# Patient Record
Sex: MALE | Race: WHITE | NOT HISPANIC OR LATINO | Employment: OTHER | URBAN - METROPOLITAN AREA
[De-identification: names, ages, dates, MRNs, and addresses within clinical notes are randomized per-mention and may not be internally consistent; named-entity substitution may affect disease eponyms.]

---

## 2017-08-10 DIAGNOSIS — R06.02 SHORTNESS OF BREATH: ICD-10-CM

## 2017-08-10 DIAGNOSIS — R07.89 OTHER CHEST PAIN: ICD-10-CM

## 2017-08-10 DIAGNOSIS — I48.91 ATRIAL FIBRILLATION: Primary | ICD-10-CM

## 2017-08-10 DIAGNOSIS — R07.82 INTERCOSTAL PAIN: ICD-10-CM

## 2017-08-11 ENCOUNTER — HOSPITAL ENCOUNTER (OUTPATIENT)
Dept: RESPIRATORY THERAPY | Facility: HOSPITAL | Age: 62
Discharge: HOME | End: 2017-08-11
Attending: SPECIALIST
Payer: MEDICARE

## 2017-08-11 DIAGNOSIS — R07.89 OTHER CHEST PAIN: ICD-10-CM

## 2017-08-11 DIAGNOSIS — R07.82 INTERCOSTAL PAIN: ICD-10-CM

## 2017-08-11 DIAGNOSIS — I48.91 ATRIAL FIBRILLATION: ICD-10-CM

## 2017-08-11 DIAGNOSIS — R06.02 SHORTNESS OF BREATH: ICD-10-CM

## 2017-08-11 PROCEDURE — 94060 EVALUATION OF WHEEZING: CPT | Mod: 26,,, | Performed by: INTERNAL MEDICINE

## 2017-08-11 PROCEDURE — 94375 RESPIRATORY FLOW VOLUME LOOP: CPT

## 2017-08-11 PROCEDURE — 94727 GAS DIL/WSHOT DETER LNG VOL: CPT | Mod: 26,,, | Performed by: INTERNAL MEDICINE

## 2017-08-11 PROCEDURE — 94729 DIFFUSING CAPACITY: CPT | Mod: 26,,, | Performed by: INTERNAL MEDICINE

## 2017-08-11 PROCEDURE — 94729 DIFFUSING CAPACITY: CPT

## 2017-08-11 PROCEDURE — 94060 EVALUATION OF WHEEZING: CPT

## 2017-08-14 NOTE — PROCEDURES
Pulmonary Functions, including spirometry and bronchodilator response and lung volumes and diffusion, study was done aug 11, 2017.  Spirometry shows no obstruction and normal vital capacity and no bronchodilator response.   FEV1 is 86% or 3.41 liters.  Lung volumes show  normal TLC.  Diffusion shows reduced to 71% per va  uncorrected for anemia if present..    Pulmonary functions show low dlco and also low MVV, suspect sub optimal effort. Clinical correlation recommended.     Rui Tillman M.D.

## 2017-08-29 ENCOUNTER — HOSPITAL ENCOUNTER (OUTPATIENT)
Dept: RADIOLOGY | Facility: HOSPITAL | Age: 62
Discharge: HOME | End: 2017-08-29
Attending: SPECIALIST
Payer: MEDICARE

## 2017-08-29 ENCOUNTER — HOSPITAL ENCOUNTER (OUTPATIENT)
Dept: CARDIOLOGY | Facility: HOSPITAL | Age: 62
Discharge: HOME | End: 2017-08-29
Attending: SPECIALIST
Payer: MEDICARE

## 2017-08-29 DIAGNOSIS — I48.91 ATRIAL FIBRILLATION: ICD-10-CM

## 2017-08-29 DIAGNOSIS — R07.89 OTHER CHEST PAIN: ICD-10-CM

## 2017-08-29 DIAGNOSIS — R06.02 SHORTNESS OF BREATH: ICD-10-CM

## 2017-08-29 DIAGNOSIS — R07.82 INTERCOSTAL PAIN: ICD-10-CM

## 2017-08-29 DIAGNOSIS — R07.82 INTERCOSTAL PAIN: Primary | ICD-10-CM

## 2017-08-29 LAB — DIASTOLIC DYSFUNCTION: NO

## 2017-08-29 PROCEDURE — 71020 XR CHEST PA AND LATERAL: CPT | Mod: 26,,, | Performed by: RADIOLOGY

## 2017-08-29 PROCEDURE — 93017 CV STRESS TEST TRACING ONLY: CPT

## 2017-08-29 PROCEDURE — 71020 XR CHEST PA AND LATERAL: CPT | Mod: TC

## 2017-08-29 PROCEDURE — 78452 HT MUSCLE IMAGE SPECT MULT: CPT | Mod: TC

## 2017-08-29 PROCEDURE — 63600175 PHARM REV CODE 636 W HCPCS

## 2017-08-29 PROCEDURE — 78452 HT MUSCLE IMAGE SPECT MULT: CPT | Mod: 26,,, | Performed by: RADIOLOGY

## 2017-08-29 RX ORDER — REGADENOSON 0.08 MG/ML
INJECTION, SOLUTION INTRAVENOUS
Status: DISPENSED
Start: 2017-08-29 | End: 2017-08-29

## 2017-10-17 ENCOUNTER — OFFICE VISIT (OUTPATIENT)
Dept: PULMONOLOGY | Facility: CLINIC | Age: 62
End: 2017-10-17
Payer: MEDICARE

## 2017-10-17 VITALS
BODY MASS INDEX: 40.46 KG/M2 | HEART RATE: 61 BPM | WEIGHT: 305.31 LBS | SYSTOLIC BLOOD PRESSURE: 149 MMHG | DIASTOLIC BLOOD PRESSURE: 76 MMHG | OXYGEN SATURATION: 97 % | HEIGHT: 73 IN

## 2017-10-17 DIAGNOSIS — R94.2 ABNORMAL PULMONARY FUNCTION: Primary | ICD-10-CM

## 2017-10-17 DIAGNOSIS — E11.22 TYPE 2 DIABETES MELLITUS WITH CHRONIC KIDNEY DISEASE, WITHOUT LONG-TERM CURRENT USE OF INSULIN, UNSPECIFIED CKD STAGE: ICD-10-CM

## 2017-10-17 DIAGNOSIS — E66.01 MORBID OBESITY: ICD-10-CM

## 2017-10-17 PROCEDURE — 99999 PR PBB SHADOW E&M-EST. PATIENT-LVL IV: CPT | Mod: PBBFAC,,, | Performed by: INTERNAL MEDICINE

## 2017-10-17 PROCEDURE — 99213 OFFICE O/P EST LOW 20 MIN: CPT | Mod: S$GLB,,, | Performed by: INTERNAL MEDICINE

## 2017-10-17 NOTE — PROGRESS NOTES
"10/17/2017    Douglas Valle  New Patient Consult    Chief Complaint   Patient presents with    Abnormal PFT       HPI: smoked til 4/2000 when had cabg. 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  Pt has 40% ef per pt and got shocked 2 yrs ago.  Drinking may have triggered a fib.  Sob bending over , no lung symptoms.  Had sleep test past.        The chief compliant  problem is new to me",   PFSH:  Past Medical History:   Diagnosis Date    Anemia     Anticoagulant long-term use     Atrial fibrillation     CAD (coronary artery disease)     DM2 (diabetes mellitus, type 2)     Elevated homocysteine     Encounter for blood transfusion     Gout     HLD (hyperlipidemia)     HTN (hypertension)     Hypothyroid     Myocardial infarction     Wears glasses          Past Surgical History:   Procedure Laterality Date    CARDIAC SURGERY      CABG X 5  4-2000    CORONARY ARTERY BYPASS GRAFT      stent          TONSILLECTOMY       Social History   Substance Use Topics    Smoking status: Former Smoker     Packs/day: 3.00     Years: 30.00     Quit date: 4/8/2000    Smokeless tobacco: Never Used    Alcohol use Yes      Comment: OCC     Family History   Problem Relation Age of Onset    Diabetes Mother     Hypertension Mother     Heart disease Father      Review of patient's allergies indicates:  No Known Allergies    Performance Status:The patient's activity level is no limits with regular activity.      Review of Systems:  a review of eleven systems covering constitutional, Eye, HEENT, Psych, Respiratory, Cardiac, GI, , Musculoskeletal, Endocrine, Dermatologic was negative except for pertinent findings as listed ABOVE and below: all good.     Exam:Comprehensive exam done. BP (!) 149/76 (BP Location: Left arm)   Pulse 61   Ht 6' 1" (1.854 m)   Wt (!) 138.5 kg (305 lb 5.4 oz)   SpO2 97%   BMI 40.28 kg/m²   Exam included Vitals as listed, and patient's appearance and " affect and alertness and mood, oral exam for yeast and hygiene and pharynx lesions and Mallapatti (M) score, neck with inspection for jvd and masses and thyroid abnormalities and lymph nodes (supraclavicular and infraclavicular nodes and axillary also examined and noted if abn), chest exam included symmetry and effort and fremitus and percussion and auscultation, cardiac exam included rhythm and gallops and murmur and rubs and jvd and edema, abdominal exam for mass and hepatosplenomegaly and tenderness and hernias and bowel sounds, Musculoskeletal exam with muscle tone and posture and mobility/gait and  strength, and skin for rashes and cyanosis and pallor and turgor, extremity for clubbing.  Findings were normal except for pertinent findings listed below:   M4, no rales, rest good.    Radiographs (ct chest and cxr) reviewed: view by direct vision  Aug 29 cxr same as 2015    Labs reviewed creat 2.1 on metformin    PFT results reviewed   Pulmonary Functions, including spirometry and bronchodilator response and lung volumes and diffusion, study was done aug 11, 2017.  Spirometry shows no obstruction and normal vital capacity and no bronchodilator response.   FEV1 is 86% or 3.41 liters.  Lung volumes show  normal TLC.  Diffusion shows reduced to 71% per va  uncorrected for anemia if present..     Pulmonary functions show low dlco and also low MVV, suspect sub optimal effort. Clinical correlation recommended.     Rui Tillman M.D.      Plan:  Clinical impression is apparently straight forward and impression with management as below.    Douglas was seen today for abnormal pft.    Diagnoses and all orders for this visit:    Abnormal pulmonary function    Type 2 diabetes mellitus with chronic kidney disease, without long-term current use of insulin, unspecified CKD stage  -     Basic metabolic panel; Future  -     Basic metabolic panel    Morbid obesity        Return if symptoms worsen or fail to improve.    Discussed  with patient above for education the following:    Amiodarone may cause lung disease- dlco is sl low, better than expected after 3 ppd.     Chest xray ok and stable from 2015 to 2017 august.    What is planned duration of amiodarone?  May get acute lung toxicity or may develop slowly.  Short breath or cough would be problem.  Breathing test monitoring not as good as stopping amiodarone in future.    Kidney function is off-- creat 2.1, metformin may be a problem.  Suggest re check kidney function- may need to stop metformin.  Ordered via [facility]?    Bariatric surgery might be consideration for overall health?

## 2018-08-20 ENCOUNTER — OFFICE VISIT (OUTPATIENT)
Dept: PULMONOLOGY | Facility: CLINIC | Age: 63
End: 2018-08-20
Payer: MEDICARE

## 2018-08-20 VITALS
HEART RATE: 56 BPM | BODY MASS INDEX: 34.8 KG/M2 | SYSTOLIC BLOOD PRESSURE: 111 MMHG | DIASTOLIC BLOOD PRESSURE: 59 MMHG | OXYGEN SATURATION: 97 % | HEIGHT: 73 IN | WEIGHT: 262.56 LBS

## 2018-08-20 DIAGNOSIS — K21.9 GASTROESOPHAGEAL REFLUX DISEASE, ESOPHAGITIS PRESENCE NOT SPECIFIED: ICD-10-CM

## 2018-08-20 DIAGNOSIS — Z79.899 LONG TERM CURRENT USE OF AMIODARONE: ICD-10-CM

## 2018-08-20 DIAGNOSIS — R94.2 ABNORMAL PULMONARY FUNCTION: Primary | ICD-10-CM

## 2018-08-20 PROCEDURE — 99999 PR PBB SHADOW E&M-EST. PATIENT-LVL IV: CPT | Mod: PBBFAC,,, | Performed by: INTERNAL MEDICINE

## 2018-08-20 PROCEDURE — 3008F BODY MASS INDEX DOCD: CPT | Mod: CPTII,S$GLB,, | Performed by: INTERNAL MEDICINE

## 2018-08-20 PROCEDURE — 3078F DIAST BP <80 MM HG: CPT | Mod: CPTII,S$GLB,, | Performed by: INTERNAL MEDICINE

## 2018-08-20 PROCEDURE — 3074F SYST BP LT 130 MM HG: CPT | Mod: CPTII,S$GLB,, | Performed by: INTERNAL MEDICINE

## 2018-08-20 PROCEDURE — 99214 OFFICE O/P EST MOD 30 MIN: CPT | Mod: S$GLB,,, | Performed by: INTERNAL MEDICINE

## 2018-08-20 RX ORDER — GABAPENTIN 300 MG/1
CAPSULE ORAL
Refills: 5 | COMMUNITY
Start: 2018-05-19 | End: 2018-11-05

## 2018-08-20 RX ORDER — PANTOPRAZOLE SODIUM 40 MG/1
40 TABLET, DELAYED RELEASE ORAL DAILY
Refills: 4 | COMMUNITY
Start: 2018-07-26 | End: 2020-01-01

## 2018-08-20 RX ORDER — FENOFIBRATE 134 MG/1
134 CAPSULE ORAL
Refills: 5 | COMMUNITY
Start: 2018-06-25 | End: 2020-01-01

## 2018-08-20 RX ORDER — LEVOTHYROXINE SODIUM 100 UG/1
TABLET ORAL
Refills: 3 | COMMUNITY
Start: 2018-05-17 | End: 2018-11-05

## 2018-08-20 NOTE — PROGRESS NOTES
"2018    Douglas Valle       Chief Complaint   Patient presents with    Abnormal pulmonary functions       HPI:   Aug 20, 2018- last 2-3 wks having burning chest pain ppt going supine - eats 630 and bed 830,. Up 430.  rolaids helps, on protonix last 3 wks - no great help.  Was 305 April, loosing wt with dieting.  Still on amiodarone.  Elevate hob helps.        Oct 17, 2017smoked til 2000 when had cabg. 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  Pt has 40% ef per pt and got shocked 2 yrs ago.  Drinking may have triggered a fib.  Sob bending over , no lung symptoms.  Had sleep test past.        The chief compliant  problem is new to me",   PFSH:  Past Medical History:   Diagnosis Date    Anemia     Anticoagulant long-term use     Atrial fibrillation     CAD (coronary artery disease)     DM2 (diabetes mellitus, type 2)     Elevated homocysteine     Encounter for blood transfusion     Gout     HLD (hyperlipidemia)     HTN (hypertension)     Hypothyroid     Myocardial infarction     Wears glasses          Past Surgical History:   Procedure Laterality Date    CARDIAC SURGERY      CABG X 5  -    CORONARY ARTERY BYPASS GRAFT      stent          TONSILLECTOMY       Social History     Tobacco Use    Smoking status: Former Smoker     Packs/day: 3.00     Years: 30.00     Pack years: 90.00     Last attempt to quit: 2000     Years since quittin.3    Smokeless tobacco: Never Used   Substance Use Topics    Alcohol use: Yes     Comment: OCC    Drug use: No     Family History   Problem Relation Age of Onset    Diabetes Mother     Hypertension Mother     Heart disease Father      Review of patient's allergies indicates:  No Known Allergies    Performance Status:The patient's activity level is no limits with regular activity.      Review of Systems:  a review of eleven systems covering constitutional, Eye, HEENT, Psych, Respiratory, Cardiac, GI, " ", Musculoskeletal, Endocrine, Dermatologic was negative except for pertinent findings as listed ABOVE and below: all good.     Exam:Comprehensive exam done. BP (!) 111/59 (BP Location: Left arm, Patient Position: Sitting)   Pulse (!) 56   Ht 6' 1" (1.854 m)   Wt 119.1 kg (262 lb 9.1 oz)   SpO2 97% Comment: on room air  BMI 34.64 kg/m²   Exam included Vitals as listed, and patient's appearance and affect and alertness and mood, oral exam for yeast and hygiene and pharynx lesions and Mallapatti (M) score, neck with inspection for jvd and masses and thyroid abnormalities and lymph nodes (supraclavicular and infraclavicular nodes and axillary also examined and noted if abn), chest exam included symmetry and effort and fremitus and percussion and auscultation, cardiac exam included rhythm and gallops and murmur and rubs and jvd and edema, abdominal exam for mass and hepatosplenomegaly and tenderness and hernias and bowel sounds, Musculoskeletal exam with muscle tone and posture and mobility/gait and  strength, and skin for rashes and cyanosis and pallor and turgor, extremity for clubbing.  Findings were normal except for pertinent findings listed below:   M4, no rales, rest good.  chest is symmetric, no distress, normal percussion, normal fremitus and good normal breath sounds      Radiographs (ct chest and cxr) reviewed: view by direct vision  Aug 29 cxr same as 2015    Labs reviewed creat 2.1 on metformin    PFT results reviewed   Pulmonary Functions, including spirometry and bronchodilator response and lung volumes and diffusion, study was done aug 11, 2017.  Spirometry shows no obstruction and normal vital capacity and no bronchodilator response.   FEV1 is 86% or 3.41 liters.  Lung volumes show  normal TLC.  Diffusion shows reduced to 71% per va  uncorrected for anemia if present..     Pulmonary functions show low dlco and also low MVV, suspect sub optimal effort. Clinical correlation recommended.   "   Rui Tillman M.D.      Plan:  Clinical impression is apparently straight forward and impression with management as below.    There are no diagnoses linked to this encounter.    No Follow-up on file.    Discussed with patient above for education the following:    Amiodarone may cause lung disease- dlco is sl low, better than expected after 3 ppd.     Chest xray ok and stable from 2015 to 2017 august.    What is planned duration of amiodarone?  May get acute lung toxicity or may develop slowly.  Short breath or cough would be problem.  Breathing test monitoring not as good as stopping amiodarone in future.    Kidney function is off-- creat 2.1, metformin may be a problem.  Suggest re check kidney function- may need to stop metformin.  Ordered via [facility]?    Bariatric surgery might be consideration for overall health?

## 2018-08-20 NOTE — PATIENT INSTRUCTIONS
Check chest xray     Burning lower breast bone chest pain ppt by going supine likely from reflux- Protonix usually helps/controlls.  Would be wise to verify swallow tube good- GI Doc?  If not controlled and not gerd- may worsen causing stress/emergency presentation.  Elevate head of bed or sleep in recliner may help.              From up to date:Screening -- There are no adequate predictors of pulmonary toxicity due to amiodarone. Guidelines suggest obtaining a baseline and annual chest radiograph and baseline pulmonary function tests (including a DLCO) [42,43]. However, serial pulmonary function tests are not helpful because changes in DLCO are not specific for toxicity [44,45]. One study prospectively evaluated the usefulness of serial DLCO and spirometry measurements in 91 patients who were receiving amiodarone therapy for refractory arrhythmias [45]. Most of the asymptomatic patients whose DLCO decreased more than 20 percent did not develop pulmonary toxicity over the next year despite continued amiodarone therapy.

## 2018-08-21 ENCOUNTER — HOSPITAL ENCOUNTER (OUTPATIENT)
Dept: RADIOLOGY | Facility: HOSPITAL | Age: 63
Discharge: HOME | End: 2018-08-21
Attending: INTERNAL MEDICINE
Payer: MEDICARE

## 2018-08-21 DIAGNOSIS — R94.2 ABNORMAL PULMONARY FUNCTION: ICD-10-CM

## 2018-08-21 DIAGNOSIS — Z79.899 LONG TERM CURRENT USE OF AMIODARONE: ICD-10-CM

## 2018-08-21 PROCEDURE — 71046 X-RAY EXAM CHEST 2 VIEWS: CPT | Mod: TC,FY

## 2018-08-21 PROCEDURE — 71046 X-RAY EXAM CHEST 2 VIEWS: CPT | Mod: 26,,, | Performed by: RADIOLOGY

## 2018-08-29 ENCOUNTER — TELEPHONE (OUTPATIENT)
Dept: PULMONOLOGY | Facility: CLINIC | Age: 63
End: 2018-08-29

## 2018-08-29 NOTE — TELEPHONE ENCOUNTER
Attempted to contact pt in regards to results. No answer.   ----- Message from Rui Tillman MD sent at 8/21/2018  1:20 PM CDT -----  Notify cxr good

## 2018-08-29 NOTE — TELEPHONE ENCOUNTER
Contacted pt to notify him of test results. No further action needed.   ----- Message from Poly Gallagher sent at 8/29/2018  4:30 PM CDT -----  Type:  Test Results    Who Called:  pt  Name of Test (Lab/Mammo/Etc): lung   Xray Best Call Back Number:  [phone]

## 2018-09-20 ENCOUNTER — TELEPHONE (OUTPATIENT)
Dept: UROLOGY | Facility: CLINIC | Age: 63
End: 2018-09-20

## 2018-09-20 ENCOUNTER — HOSPITAL ENCOUNTER (EMERGENCY)
Facility: HOSPITAL | Age: 63
Discharge: HOME | End: 2018-09-20
Attending: EMERGENCY MEDICINE
Payer: MEDICARE

## 2018-09-20 VITALS
TEMPERATURE: 98 F | DIASTOLIC BLOOD PRESSURE: 72 MMHG | BODY MASS INDEX: 34.95 KG/M2 | SYSTOLIC BLOOD PRESSURE: 149 MMHG | WEIGHT: 258 LBS | HEIGHT: 72 IN | HEART RATE: 95 BPM | RESPIRATION RATE: 16 BRPM

## 2018-09-20 DIAGNOSIS — S37.30XA URETHRAL INJURY, INITIAL ENCOUNTER: Primary | ICD-10-CM

## 2018-09-20 LAB
BACTERIA #/AREA URNS HPF: ABNORMAL /HPF
BILIRUB UR QL STRIP: NEGATIVE
CLARITY UR: CLEAR
COLOR UR: YELLOW
GLUCOSE UR QL STRIP: NEGATIVE
HGB UR QL STRIP: ABNORMAL
KETONES UR QL STRIP: NEGATIVE
LEUKOCYTE ESTERASE UR QL STRIP: NEGATIVE
MICROSCOPIC COMMENT: ABNORMAL
NITRITE UR QL STRIP: NEGATIVE
PH UR STRIP: 6 [PH] (ref 5–8)
PROT UR QL STRIP: NEGATIVE
RBC #/AREA URNS HPF: 16 /HPF (ref 0–4)
SP GR UR STRIP: >=1.03 (ref 1–1.03)
SQUAMOUS #/AREA URNS HPF: 1 /HPF
URN SPEC COLLECT METH UR: ABNORMAL
UROBILINOGEN UR STRIP-ACNC: NEGATIVE EU/DL
WBC #/AREA URNS HPF: 0 /HPF (ref 0–5)

## 2018-09-20 PROCEDURE — 81000 URINALYSIS NONAUTO W/SCOPE: CPT

## 2018-09-20 PROCEDURE — 99283 EMERGENCY DEPT VISIT LOW MDM: CPT

## 2018-09-20 PROCEDURE — 87086 URINE CULTURE/COLONY COUNT: CPT

## 2018-09-20 RX ORDER — SILDENAFIL CITRATE 20 MG/1
20 TABLET ORAL 3 TIMES DAILY
COMMUNITY
End: 2018-11-05

## 2018-09-20 NOTE — DISCHARGE INSTRUCTIONS
Urethral injury - Return to ED for worsening bleeding or other new or worsening symptoms. Follow up with urology.    Consider stopping Xarelto until bleeding stops for good.  No sex or masturbation for 1 week to avoid urethral trauma and rebleeding.

## 2018-09-20 NOTE — ED PROVIDER NOTES
Encounter Date: 9/20/2018    SCRIBE #1 NOTE: Trinity PICKERING am scribing for, and in the presence of, Dr. Faith .       History     Chief Complaint   Patient presents with    Hematuria     pt reports took 3 sildenfil and then noticed bleeding with uriniation       Time seen by provider: 2:07 AM on 09/20/2018    Douglas Valle is a 63 y.o. male with CAD, NIDDM, pulmonary HTN and Atrial fibrillation on Xarelto and Prasugrel who presents to the ED with blood during urination onset 12 hours. Patient states that after he urinates he has blood that leaks specifically after he is finished urinating. Patient endorses having sex 12 hours ago and after sex the symptoms began. He states he is able to stop the bleeding occasionally by holding pressure. He denies any penile pain, scrotal pain, penile swelling, discharge, testicular pain, abdominal pain, painful urination, back pain, or fever.       The history is provided by the patient. No  was used.     Review of patient's allergies indicates:  No Known Allergies  Past Medical History:   Diagnosis Date    Anemia     Anticoagulant long-term use     Atrial fibrillation     CAD (coronary artery disease)     DM2 (diabetes mellitus, type 2)     Elevated homocysteine     Encounter for blood transfusion     Gout     HLD (hyperlipidemia)     HTN (hypertension)     Hypothyroid     Myocardial infarction     Wears glasses      Past Surgical History:   Procedure Laterality Date    CARDIAC SURGERY      CABG X 5  4-2000    CHOLECYSTECTOMY-LAPAROSCOPIC N/A 5/11/2015    [facility]    CORONARY ARTERY BYPASS GRAFT      ERCP N/A 5/15/2015    Performed by Prasanna Giraldo MD at [facility] ENDO    ERCP N/A 2/16/2015    [facility]O    stent          TONSILLECTOMY       Family History   Problem Relation Age of Onset    Diabetes Mother     Hypertension Mother     Heart disease  Father      Social History     Tobacco Use    Smoking status: Former Smoker     Packs/day: 3.00     Years: 30.00     Pack years: 90.00     Last attempt to quit: 2000     Years since quittin.4    Smokeless tobacco: Never Used   Substance Use Topics    Alcohol use: Yes     Comment: OCC    Drug use: No     Review of Systems   Constitutional: Negative for fever.   HENT: Negative for drooling and sore throat.    Eyes: Negative for photophobia and visual disturbance.   Respiratory: Negative for cough and shortness of breath.    Cardiovascular: Negative for chest pain.   Gastrointestinal: Negative for abdominal pain, diarrhea, nausea and vomiting.   Genitourinary: Positive for discharge (blood) and hematuria (end of void). Negative for difficulty urinating, dysuria, flank pain, genital sores, penile pain, penile swelling, scrotal swelling and testicular pain.   Musculoskeletal: Negative for back pain and neck pain.   Skin: Negative for rash.   Neurological: Negative for weakness and numbness.   Hematological: Does not bruise/bleed easily.   Psychiatric/Behavioral: Negative for confusion.       Physical Exam     Initial Vitals [18 0203]   BP Pulse Resp Temp SpO2   (!) 149/72 95 16 98 °F (36.7 °C) --      MAP       --         Physical Exam    Nursing note and vitals reviewed.  Constitutional: He appears well-developed and well-nourished. He is not diaphoretic. No distress.   HENT:   Head: Normocephalic and atraumatic.   Mouth/Throat: Oropharynx is clear and moist.   Eyes: Conjunctivae are normal.   Neck: Neck supple.   Cardiovascular: Normal rate, regular rhythm, normal heart sounds and intact distal pulses. Exam reveals no gallop and no friction rub.    No murmur heard.  Pulmonary/Chest: Breath sounds normal. He has no wheezes. He has no rhonchi. He has no rales.   Abdominal: Soft. He exhibits no distension. There is no tenderness.   Genitourinary: Testes normal. Right testis shows no swelling and no  tenderness. Left testis shows no swelling and no tenderness. Circumcised. No penile tenderness. Discharge (blood) found.   Genitourinary Comments: Small amount of dark blood noted at urethral meatus. No penile tenderness or ecchymosis. Bilateral descended testes without tenderness or swelling.    Musculoskeletal: Normal range of motion.   Neurological: He is alert and oriented to person, place, and time.   Skin: Skin is warm. Capillary refill takes less than 2 seconds. No rash noted. No erythema.         ED Course   Procedures  Labs Reviewed   URINALYSIS - Abnormal; Notable for the following components:       Result Value    Specific Gravity, UA >=1.030 (*)     Occult Blood UA 2+ (*)     All other components within normal limits   URINALYSIS MICROSCOPIC - Abnormal; Notable for the following components:    RBC, UA 16 (*)     All other components within normal limits   CULTURE, URINE          Imaging Results    None          Medical Decision Making:   History:   Old Medical Records: I decided to obtain old medical records.  Clinical Tests:   Lab Tests: Ordered and Reviewed            Scribe Attestation:   Scribe #1: I performed the above scribed service and the documentation accurately describes the services I performed. I attest to the accuracy of the note.    I, Dr. Ti Faith, personally performed the services described in this documentation. All medical record entries made by the scribe were at my direction and in my presence.  I have reviewed the chart and agree that the record reflects my personal performance and is accurate and complete. Ti Faith MD.  3:31 AM 09/20/2018    Douglas Valle is a 63 y.o. male presenting with minor urethral trauma likely sustained following sexual intercourse.  Patient has noted minor bleeding from the urethra apart from urination and at the end of voiding at times.  He is notably on rivaroxaban for AFib.  I doubt life-threatening bleeding.  UTI is very unlikely.   I suspect minor trauma to the urethra without complete disruption.  Rothman catheter for tamponade of bleeding discussed and declined.  I feel this is reasonable.  We did discuss temporary discontinuation of rivaroxaban with transient mild increased stroke risk with AFib versus better control of bleeding.  He elects to discontinue rivaroxaban until bleeding has stopped.  There is minimal bleeding evident the emergency department.  He is voiding without difficulty with no sign of obstruction.  I do not think emergent urologic intervention is indicated in this appropriate for outpatient follow-up.  Avoid any manipulation of the pedis including intercourse for 1 week after bleeding ceases.  Detailed return precautions reviewed.           Clinical Impression:   The encounter diagnosis was Urethral injury, initial encounter.      Disposition:   Disposition: Discharged  Condition: Stable                        Ti Faith MD  09/20/18 [id number]

## 2018-09-20 NOTE — TELEPHONE ENCOUNTER
Spoke with patient appointment scheduled for Tuesday 9/25 at 2:30pm with . Patient verbally voiced understanding.

## 2018-09-20 NOTE — ED NOTES
Patient identifiers for Douglas Valle checked and correct.  LOC:  Patient is awake, alert, and aware of environment with an appropriate affect. Patient is oriented x 3 and speaking appropriately.  APPEARANCE:  Patient resting comfortably and in no acute distress. Patient is clean and well groomed, patient's clothing is properly fastened.  SKIN:  The skin is warm and dry. Patient has normal skin turgor and moist mucus membranes. Skin is intact; no bruising or breakdown noted.  MUSCULOSKELETAL:  Patient is moving all extremities well, no obvious deformities noted. Pulses intact.   RESPIRATORY:  Airway is open and patent. Respirations are spontaneous and non-labored with normal effort and rate.  CARDIAC:  Patient has a normal rate and rhythm. No peripheral edema noted. Capillary refill < 3 seconds.  ABDOMEN:  No distention noted.  Soft and non-tender upon palpation.  NEUROLOGICAL:  PERRL. Facial expression is symmetrical. Hand grasps are equal bilaterally. Normal sensation in all extremities when touched with finger.  Allergies reported:  Review of patient's allergies indicates:  No Known Allergies  OTHER NOTES:  CO penile bleeding during and after UA.  States that he is on blood thinners.

## 2018-09-20 NOTE — TELEPHONE ENCOUNTER
----- Message from Charlene Lovett sent at 9/20/2018  8:16 AM CDT -----  Contact: Douglas  Type:  Sooner Apoointment Request    Caller is requesting a sooner appointment.  Caller declined first available appointment listed below.  Caller will not accept being placed on the waitlist and is requesting a message be sent to doctor.    Name of Caller:  patient  When is the first available appointment?  No schedule is pulling up  Symptoms:  [facility] 9/19/18; urethra injury, hematuria  Best Call Back Number:  [phone]  Additional Information:   States was instructed to follow up with Dr Arreaga. Thanks!

## 2018-09-21 LAB — BACTERIA UR CULT: NO GROWTH

## 2018-11-05 ENCOUNTER — HOSPITAL ENCOUNTER (EMERGENCY)
Facility: HOSPITAL | Age: 63
Discharge: HOME | End: 2018-11-05
Attending: EMERGENCY MEDICINE
Payer: MEDICARE

## 2018-11-05 ENCOUNTER — TELEPHONE (OUTPATIENT)
Dept: UROLOGY | Facility: CLINIC | Age: 63
End: 2018-11-05

## 2018-11-05 VITALS
OXYGEN SATURATION: 98 % | WEIGHT: 251.56 LBS | TEMPERATURE: 98 F | SYSTOLIC BLOOD PRESSURE: 130 MMHG | HEIGHT: 73 IN | RESPIRATION RATE: 16 BRPM | DIASTOLIC BLOOD PRESSURE: 80 MMHG | HEART RATE: 65 BPM | BODY MASS INDEX: 33.34 KG/M2

## 2018-11-05 VITALS
HEART RATE: 67 BPM | OXYGEN SATURATION: 99 % | SYSTOLIC BLOOD PRESSURE: 151 MMHG | TEMPERATURE: 99 F | HEIGHT: 72 IN | RESPIRATION RATE: 18 BRPM | DIASTOLIC BLOOD PRESSURE: 70 MMHG | WEIGHT: 255 LBS | BODY MASS INDEX: 34.54 KG/M2

## 2018-11-05 DIAGNOSIS — R31.0 GROSS HEMATURIA: Primary | ICD-10-CM

## 2018-11-05 LAB
ALBUMIN SERPL BCP-MCNC: 4.1 G/DL
ALP SERPL-CCNC: 41 U/L
ALT SERPL W/O P-5'-P-CCNC: 11 U/L
ANION GAP SERPL CALC-SCNC: 9 MMOL/L
AST SERPL-CCNC: 16 U/L
BACTERIA #/AREA URNS HPF: ABNORMAL /HPF
BASOPHILS # BLD AUTO: 0.1 K/UL
BASOPHILS NFR BLD: 1.7 %
BILIRUB SERPL-MCNC: 0.5 MG/DL
BILIRUB UR QL STRIP: NEGATIVE
BUN SERPL-MCNC: 34 MG/DL
CALCIUM SERPL-MCNC: 9.8 MG/DL
CHLORIDE SERPL-SCNC: 108 MMOL/L
CLARITY UR: ABNORMAL
CO2 SERPL-SCNC: 22 MMOL/L
COLOR UR: ABNORMAL
CREAT SERPL-MCNC: 2.2 MG/DL
DIFFERENTIAL METHOD: ABNORMAL
EOSINOPHIL # BLD AUTO: 0.2 K/UL
EOSINOPHIL NFR BLD: 3.1 %
ERYTHROCYTE [DISTWIDTH] IN BLOOD BY AUTOMATED COUNT: 14.9 %
EST. GFR  (AFRICAN AMERICAN): 35 ML/MIN/1.73 M^2
EST. GFR  (NON AFRICAN AMERICAN): 31 ML/MIN/1.73 M^2
GLUCOSE SERPL-MCNC: 104 MG/DL
GLUCOSE UR QL STRIP: NEGATIVE
HCT VFR BLD AUTO: 41.3 %
HGB BLD-MCNC: 14 G/DL
HGB UR QL STRIP: ABNORMAL
KETONES UR QL STRIP: NEGATIVE
LEUKOCYTE ESTERASE UR QL STRIP: NEGATIVE
LYMPHOCYTES # BLD AUTO: 1.1 K/UL
LYMPHOCYTES NFR BLD: 14.2 %
MCH RBC QN AUTO: 31 PG
MCHC RBC AUTO-ENTMCNC: 34 G/DL
MCV RBC AUTO: 91 FL
MICROSCOPIC COMMENT: ABNORMAL
MONOCYTES # BLD AUTO: 0.5 K/UL
MONOCYTES NFR BLD: 6.6 %
NEUTROPHILS # BLD AUTO: 5.9 K/UL
NEUTROPHILS NFR BLD: 74.4 %
NITRITE UR QL STRIP: NEGATIVE
PH UR STRIP: 6 [PH] (ref 5–8)
PLATELET # BLD AUTO: 183 K/UL
PMV BLD AUTO: 8.2 FL
POTASSIUM SERPL-SCNC: 4.2 MMOL/L
PROT SERPL-MCNC: 7 G/DL
PROT UR QL STRIP: ABNORMAL
RBC # BLD AUTO: 4.52 M/UL
RBC #/AREA URNS HPF: >100 /HPF (ref 0–4)
SODIUM SERPL-SCNC: 139 MMOL/L
SP GR UR STRIP: 1.02 (ref 1–1.03)
URN SPEC COLLECT METH UR: ABNORMAL
UROBILINOGEN UR STRIP-ACNC: NEGATIVE EU/DL
WBC # BLD AUTO: 7.9 K/UL
WBC #/AREA URNS HPF: 6 /HPF (ref 0–5)

## 2018-11-05 PROCEDURE — 88112 CYTOPATH CELL ENHANCE TECH: CPT | Mod: 26,,, | Performed by: PATHOLOGY

## 2018-11-05 PROCEDURE — 99283 EMERGENCY DEPT VISIT LOW MDM: CPT

## 2018-11-05 PROCEDURE — 85025 COMPLETE CBC W/AUTO DIFF WBC: CPT

## 2018-11-05 PROCEDURE — 99283 EMERGENCY DEPT VISIT LOW MDM: CPT | Mod: 27

## 2018-11-05 PROCEDURE — 88112 CYTOPATH CELL ENHANCE TECH: CPT | Performed by: PATHOLOGY

## 2018-11-05 PROCEDURE — 80053 COMPREHEN METABOLIC PANEL: CPT

## 2018-11-05 PROCEDURE — 81000 URINALYSIS NONAUTO W/SCOPE: CPT

## 2018-11-05 PROCEDURE — 36415 COLL VENOUS BLD VENIPUNCTURE: CPT

## 2018-11-05 NOTE — TELEPHONE ENCOUNTER
Returned call, patient went to [facility] for [facility]uria and told to follow up with Urology, soonest appt made to see the NP, patient verbally understood.

## 2018-11-05 NOTE — ED PROVIDER NOTES
Encounter Date: 11/5/2018    SCRIBE #1 NOTE: ITiffanie, am scribing for, and in the presence of, Ti Faith MD.       History     Chief Complaint   Patient presents with    Hematuria     started this morning       Time seen by provider: 9:23 AM on 11/05/2018    Douglas Valle is a 63 y.o. male with pmhx of HTN, DM2, CAD, Afib on Prasugrel and Rivaroxaban, and prior MI who presents to the ED for evaluation of hematuria. About 2 months ago (9/2018), the pt was evaluated for [facility] ED for evaluation of traumatic hematuria after intercourse. The pt noted hematuria towards the terminal portion of voiding. He was referred to Urology, but did not follow up, as his symptoms stopped the day after.  Today, the pt urinated once when he woke up and did not notice any blood. However, about 30 minutes pta, the pt urinated again and noticed blood. He reports that there is blood when he begins to urinate, but it clears up. He called Dr. Jackson, his PCP, who referred him to the ED for evaluation of possible kidney stones. He reports having some mild back pain last night, but it is currently relieved.   The patient denies fever, difficulty urinating, burning with urination, abdominal pain,vomiting, testicular pain, or any other symptoms at this time. SHx: CABG x5 (2000), Coronary Stent Placement (2014). Former Smoker (3 ppd, quit 2000). NKDA.       The history is provided by the patient.     Review of patient's allergies indicates:  No Known Allergies  Past Medical History:   Diagnosis Date    Anemia     Anticoagulant long-term use     Atrial fibrillation     CAD (coronary artery disease)     DM2 (diabetes mellitus, type 2)     Elevated homocysteine     Encounter for blood transfusion     Gout     HLD (hyperlipidemia)     HTN (hypertension)     Hypothyroid     Myocardial infarction     Wears glasses      Past Surgical History:   Procedure Laterality Date    CARDIAC SURGERY      CABG X 5       CHOLECYSTECTOMY-LAPAROSCOPIC N/A 2015    [facility]    CORONARY ARTERY BYPASS GRAFT      ERCP N/A 5/15/2015    [facility] ENDO    ERCP N/A 2015    [facility]O    stent          TONSILLECTOMY       Family History   Problem Relation Age of Onset    Diabetes Mother     Hypertension Mother     Heart disease Father      Social History     Tobacco Use    Smoking status: Former Smoker     Packs/day: 3.00     Years: 30.00     Pack years: 90.00     Last attempt to quit: 2000     Years since quittin.5    Smokeless tobacco: Never Used   Substance Use Topics    Alcohol use: Yes     Comment: OCC    Drug use: No     Review of Systems   Constitutional: Negative for fever.   HENT: Negative for sore throat.    Respiratory: Negative for shortness of breath.    Cardiovascular: Negative for chest pain.   Gastrointestinal: Negative for abdominal pain, nausea and vomiting.   Genitourinary: Positive for hematuria. Negative for difficulty urinating, dysuria and testicular pain.   Musculoskeletal: Positive for back pain (last night, now relieved).   Skin: Negative for rash.   Neurological: Negative for weakness.   Hematological: Does not bruise/bleed easily.       Physical Exam     Initial Vitals [18 0914]   BP Pulse Resp Temp SpO2   (!) 151/70 67 18 98.5 °F (36.9 °C) 99 %      MAP       --         Physical Exam    Nursing note and vitals reviewed.  Constitutional: He appears well-developed and well-nourished. He is not diaphoretic. No distress.   HENT:   Head: Normocephalic and atraumatic.   Mouth/Throat: Oropharynx is clear and moist.   Eyes: Conjunctivae are normal.   Neck: Neck supple.   Cardiovascular: Normal rate, regular rhythm, normal heart sounds and intact distal pulses. Exam reveals no gallop and no friction rub.    No murmur heard.  Pulmonary/Chest: Breath sounds normal. He has  no wheezes. He has no rhonchi. He has no rales.   Abdominal: Soft. He exhibits no distension. There is no tenderness. There is no CVA tenderness.   Musculoskeletal: Normal range of motion.   Neurological: He is alert and oriented to person, place, and time.   Skin: No rash noted. No erythema.         ED Course   Procedures  Labs Reviewed   URINALYSIS, REFLEX TO URINE CULTURE - Abnormal; Notable for the following components:       Result Value    Color, UA Red (*)     Appearance, UA Hazy (*)     Protein, UA Trace (*)     Occult Blood UA 3+ (*)     All other components within normal limits    Narrative:     Preferred Collection Type->Urine, Clean Catch   URINALYSIS MICROSCOPIC - Abnormal; Notable for the following components:    RBC, UA >100 (*)     WBC, UA 6 (*)     Bacteria, UA Few (*)     All other components within normal limits    Narrative:     Preferred Collection Type->Urine, Clean Catch          Imaging Results    None          Medical Decision Making:   History:   Old Medical Records: I decided to obtain old medical records.  Clinical Tests:   Lab Tests: Ordered and Reviewed            Scribe Attestation:   Scribe #1: I performed the above scribed service and the documentation accurately describes the services I performed. I attest to the accuracy of the note.    I, Dr. Ti Faith, personally performed the services described in this documentation. All medical record entries made by the scribe were at my direction and in my presence.  I have reviewed the chart and agree that the record reflects my personal performance and is accurate and complete. Ti Faith MD.  12:11 PM 11/05/2018    Douglas Valle is a 63 y.o. male presenting with gross hematuria without other associated symptoms.  He is voiding without difficulty and I doubt urinary retention.  He is notably anticoagulated.  I do not think other laboratories are indicated.  He appears euvolemic.  He did have similar transient episode  that was post coital in the past.  There is no postcoital or other traumatic history surrounding this episode.  I strongly urged him to follow up with Urology for further workup.  There is no indication of UTI on urinalysis.  I do not think Rothman catheter is indicated at this point.  Hematuria is clearing on repeat urination in the emergency department.  I doubt nephritis or acute kidney injury. Renal colic is unlikely lacking nausea or abdominal or flank pain. Return precautions reviewed.           Clinical Impression:     1. Gross hematuria                               Ti Faith MD  11/05/18 1214

## 2018-11-05 NOTE — TELEPHONE ENCOUNTER
----- Message from Theresakelly Marrero sent at 11/5/2018  1:00 PM CST -----  Contact: HANNAH JOHNS [[id number]]            Name of Who is Calling: HANNAH JOHNS [[id number]]    What is the request in detail: Patient called he states that he need an appointment as soon as possible, he have blood in his urine. Please call him to schedule an appointment this week.       Can the clinic reply by [facility]:no      What Number to Call Back if not in [facility]:

## 2018-11-06 ENCOUNTER — OFFICE VISIT (OUTPATIENT)
Dept: UROLOGY | Facility: CLINIC | Age: 63
End: 2018-11-06
Payer: MEDICARE

## 2018-11-06 ENCOUNTER — APPOINTMENT (OUTPATIENT)
Dept: LAB | Facility: HOSPITAL | Age: 63
End: 2018-11-06
Attending: NURSE PRACTITIONER
Payer: MEDICARE

## 2018-11-06 VITALS
DIASTOLIC BLOOD PRESSURE: 73 MMHG | TEMPERATURE: 99 F | RESPIRATION RATE: 18 BRPM | HEIGHT: 73 IN | BODY MASS INDEX: 33.04 KG/M2 | HEART RATE: 81 BPM | WEIGHT: 249.31 LBS | SYSTOLIC BLOOD PRESSURE: 150 MMHG

## 2018-11-06 DIAGNOSIS — R31.0 HEMATURIA, GROSS: Primary | ICD-10-CM

## 2018-11-06 DIAGNOSIS — R31.0 GROSS HEMATURIA: ICD-10-CM

## 2018-11-06 LAB
BILIRUB SERPL-MCNC: ABNORMAL MG/DL
BLOOD URINE, POC: 250
COLOR, POC UA: ABNORMAL
GLUCOSE UR QL STRIP: ABNORMAL
KETONES UR QL STRIP: ABNORMAL
LEUKOCYTE ESTERASE URINE, POC: ABNORMAL
NITRITE, POC UA: ABNORMAL
PH, POC UA: 5
PROTEIN, POC: 30
SPECIFIC GRAVITY, POC UA: 1.02
UROBILINOGEN, POC UA: ABNORMAL

## 2018-11-06 PROCEDURE — 3077F SYST BP >= 140 MM HG: CPT | Mod: CPTII,S$GLB,, | Performed by: NURSE PRACTITIONER

## 2018-11-06 PROCEDURE — 3008F BODY MASS INDEX DOCD: CPT | Mod: CPTII,S$GLB,, | Performed by: NURSE PRACTITIONER

## 2018-11-06 PROCEDURE — 99999 PR PBB SHADOW E&M-EST. PATIENT-LVL V: CPT | Mod: PBBFAC,,, | Performed by: NURSE PRACTITIONER

## 2018-11-06 PROCEDURE — 81002 URINALYSIS NONAUTO W/O SCOPE: CPT | Mod: S$GLB,,, | Performed by: NURSE PRACTITIONER

## 2018-11-06 PROCEDURE — 99204 OFFICE O/P NEW MOD 45 MIN: CPT | Mod: 25,S$GLB,, | Performed by: NURSE PRACTITIONER

## 2018-11-06 PROCEDURE — 88112 CYTOPATH CELL ENHANCE TECH: CPT | Mod: 26,,, | Performed by: PATHOLOGY

## 2018-11-06 PROCEDURE — 3078F DIAST BP <80 MM HG: CPT | Mod: CPTII,S$GLB,, | Performed by: NURSE PRACTITIONER

## 2018-11-06 PROCEDURE — 88112 CYTOPATH CELL ENHANCE TECH: CPT | Performed by: PATHOLOGY

## 2018-11-06 PROCEDURE — 87086 URINE CULTURE/COLONY COUNT: CPT

## 2018-11-06 RX ORDER — DOXYCYCLINE HYCLATE 100 MG
100 TABLET ORAL 2 TIMES DAILY
Qty: 28 TABLET | Refills: 0 | Status: SHIPPED | OUTPATIENT
Start: 2018-11-06 | End: 2018-11-20

## 2018-11-06 NOTE — ED PROVIDER NOTES
Encounter Date: 11/5/2018    SCRIBE #1 NOTE: I, Herbert Llanes, am scribing for, and in the presence of, Dr. Mae.       History     Chief Complaint   Patient presents with    Hematuria     Seen here this morning for same thing. Has not stopped and continues to bleed and not with just urination     11/05/2018 8:28 PM    The pt is a 63 y.o. male with a past medical history of anemia, atrial fibrilation, CAD, DM, HLD, HTN, hypothyroid, and MI presenting to the ED with a sudden onset of acute hematuria beginning 12 hrs ago. The pt was seen in the ED 12 hrs ago for similar symptoms. He reported he has been bleeding from his penis continuously. The pt stated symptoms are improved with applied pressure. He stated he takes xarelto, pletal, and advil. He has a history of cigarette smoking. The pt has a past surgical history of CABG and coronary stent placement.        The history is provided by the patient.     Review of patient's allergies indicates:  No Known Allergies  Past Medical History:   Diagnosis Date    Anemia     Anticoagulant long-term use     Atrial fibrillation     CAD (coronary artery disease)     DM2 (diabetes mellitus, type 2)     Elevated homocysteine     Encounter for blood transfusion     Gout     HLD (hyperlipidemia)     HTN (hypertension)     Hypothyroid     Myocardial infarction     Wears glasses      Past Surgical History:   Procedure Laterality Date    CARDIAC SURGERY      CABG X 5  4-2000    CHOLECYSTECTOMY-LAPAROSCOPIC N/A 5/11/2015    [facility]    CORONARY ARTERY BYPASS GRAFT      ERCP N/A 5/15/2015    [facility] ENDO    ERCP N/A 2/16/2015    [facility]    stent          TONSILLECTOMY       Family History   Problem Relation Age of Onset    Diabetes Mother     Hypertension Mother     Heart disease Father      Social History     Tobacco Use    Smoking status: Former  Smoker     Packs/day: 3.00     Years: 30.00     Pack years: 90.00     Last attempt to quit: 2000     Years since quittin.5    Smokeless tobacco: Never Used   Substance Use Topics    Alcohol use: Yes     Comment: OCC    Drug use: No     Review of Systems   Constitutional: Negative for fever.   HENT: Negative for congestion.    Eyes: Negative for visual disturbance.   Respiratory: Negative for wheezing.    Cardiovascular: Negative for chest pain.   Gastrointestinal: Negative for abdominal pain.   Genitourinary: Positive for hematuria. Negative for dysuria.   Musculoskeletal: Negative for joint swelling.   Skin: Negative for rash.   Neurological: Negative for syncope.   Hematological: Does not bruise/bleed easily.   Psychiatric/Behavioral: Negative for confusion.       Physical Exam     Initial Vitals [18]   BP Pulse Resp Temp SpO2   130/80 65 16 98.1 °F (36.7 °C) 98 %      MAP       --         Physical Exam    Nursing note and vitals reviewed.  Constitutional: He appears well-developed and well-nourished.  Non-toxic appearance. No distress.   HENT:   Head: Normocephalic and atraumatic.   Eyes: EOM are normal. Pupils are equal, round, and reactive to light.   Neck: Normal range of motion. Neck supple. No neck rigidity. No JVD present.   Cardiovascular: Normal rate, regular rhythm, normal heart sounds and intact distal pulses. Exam reveals no gallop and no friction rub.    No murmur heard.  Pulmonary/Chest: Breath sounds normal. He has no wheezes. He has no rhonchi. He has no rales.   Abdominal: Soft. Bowel sounds are normal. He exhibits no distension. There is no tenderness. There is no rigidity, no rebound and no guarding.   Genitourinary:   Genitourinary Comments: Gross hematuria with golf ball sized clots around penis   Musculoskeletal: Normal range of motion.   Neurological: He is alert and oriented to person, place, and time. He has normal strength and normal reflexes. No cranial nerve  deficit or sensory deficit. He exhibits normal muscle tone. Coordination normal. GCS eye subscore is 4. GCS verbal subscore is 5. GCS motor subscore is 6.   Skin: Skin is warm and dry.   Psychiatric: He has a normal mood and affect. His speech is normal and behavior is normal. He is not actively hallucinating.         ED Course   Procedures  Labs Reviewed   CBC W/ AUTO DIFFERENTIAL - Abnormal; Notable for the following components:       Result Value    RBC 4.52 (*)     RDW 14.9 (*)     MPV 8.2 (*)     Gran% 74.4 (*)     Lymph% 14.2 (*)     All other components within normal limits   COMPREHENSIVE METABOLIC PANEL - Abnormal; Notable for the following components:    CO2 22 (*)     BUN, Bld 34 (*)     Creatinine 2.2 (*)     Alkaline Phosphatase 41 (*)     eGFR if  35 (*)     eGFR if non  31 (*)     All other components within normal limits   CYTOLOGY SPECIMEN-URINE          Imaging Results    None          Medical Decision Making:   History:   Old Medical Records: I decided to obtain old medical records.  Old Records Summarized: records from previous admission(s).       <> Summary of Records: Patient evaluated emergency department earlier today for the same complaint.  Urinalysis did not show evidence of infection.  Initial Assessment:   63-year-old man presents emergency department for the 2nd time today complaining of gross hematuria.  He has follow-up scheduled with Urology at 1:00 p.m. tomorrow.  Patient continued to have episodes of gross hematuria throughout the day.  He states that he is having blood dripping from his urethra even without needing to urinate and having to wear pads in his underwear.  He has no signs of symptomatic anemia.  No syncope.  Laboratory evaluation was performed and patient is not anemic.  He has no tachycardia.  I do not believe he is having a significant amount of hemorrhage that would necessitate inpatient hospitalization or blood transfusion.  His  renal function is decreased but is consistent with previous labs.  Discussed with Dr. jackson who will see the patient in clinic tomorrow.  Offered admission to the patient but they would prefer to follow up on outpatient basis with the urologist.  Urine cytology has been sent.  I believe patient is in stable condition for outpatient follow-up.  Return precautions discussed for worsening symptoms.  Clinical Tests:   Lab Tests: Ordered and Reviewed            Scribe Attestation:   Scribe #1: I performed the above scribed service and the documentation accurately describes the services I performed. I attest to the accuracy of the note.    I, Tu Garcia, personally performed the services described in this documentation. All medical record entries made by the scribe were at my direction and in my presence.  I have reviewed the chart and agree that the record reflects my personal performance and is accurate and complete. Ivan Mae MD.  11:40 PM 11/05/2018             Clinical Impression:   The encounter diagnosis was Gross hematuria.      Disposition:   Disposition: Discharged  Condition: Stable                        Ivan Mae MD  11/05/18 3857

## 2018-11-06 NOTE — PATIENT INSTRUCTIONS
Blood in the Urine    Blood in the urine (hematuria) has many possible causes. If it occurs after an injury (such as a car accident or fall), it is most often a sign of bruising to the kidney or bladder. Common causes of blood in the urine include urinary tract infections, kidney stones, inflammation, tumors, or certain other diseases of the kidney or bladder. Menstruation can cause blood to appear in the urine sample, although it is not coming from the urinary tract.  If only a trace amount of blood is present, it will show up on the urine test, even though the urine may be yellow and not pink or red. This may occur with any of the above conditions, as well as heavy exercise or high fever. In this case, your doctor may want to repeat the urine test on another day. This will show if the blood is still present. If it is, then other tests can be done to find out the cause.  Home care  Follow these home care guidelines:  · If your urine does not appear bloody (pink, brown or red) then you do not need to restrict your activity in any way.  · If you can see blood in your urine, rest and avoid heavy exertion until your next exam. Do not use aspirin, blood thinners, or anti-platelet or anti-inflammatory medicines. These include ibuprofen and naproxen. These thin the blood and may increase bleeding.  Follow-up care  Follow up with your healthcare provider, or as advised. If you were injured and had blood in your urine, you should have a repeat urine test in 1 to 2 days. Contact your doctor for this test.  A radiologist will review any X-rays that were taken. You will be told of any new findings that may affect your care.  When to seek medical advice  Call your healthcare provider right away if any of these occur:  · Bright red blood or blood clots in the urine (if you did not have this before)  · Weakness, dizziness or fainting  · New groin, abdominal, or back pain  · Fever of 100.4ºF (38ºC) or higher, or as directed by  your healthcare provider  · Repeated vomiting  · Bleeding from the nose or gums or easy bruising  Date Last Reviewed: 9/1/2016 © 2000-2017 [employer]. [address] [place] [address], [place] [address]. All rights reserved. This information is not intended as a substitute for professional medical care. Always follow your healthcare professional's instructions.        What is Hematuria?  Blood in your urine is a condition known as hematuria. Most of the time, the cause of hematuria is not serious. But, never ignore blood in the urine. Your doctor can evaluate you to find the cause of the bleeding and treat it, if needed.  Types of hematuria  · Gross hematuria means that the blood can be seen by the naked eye. The urine may look pinkish, brownish, or bright red.  · Microscopic hematuria means that the urine is clear, but blood cells can be seen when urine is looked at under a microscope or tested in a lab.  Both types of hematuria can have the same causes. Neither one is necessarily more serious than the other. With either type, you may have other symptoms, such as pain, pressure, or burning when you urinate, abdominal pain, or back pain. Or, you may not have any other symptoms. No matter how much blood is found, the cause of the bleeding needs to be identified.  Finding the cause of hematuria  To evaluate your condition, your doctor will first confirm that blood is indeed present. Then other tests are done to pinpoint where the blood is coming from and why. Your doctor will decide which tests will best determine the cause of your hematuria. Some common tests are listed below.  · History and physical exam  · Lab tests may include urinalysis, a urine culture, a urine cytology, and various blood tests  · Cystoscopy  · Computed tomography (CT) or CT urography  · Magnetic resonance imaging (MRI) or MR urography  · Ultrasound of the kidney  · Kidney biopsy  Causes of hematuria include the very benign (exercised  induced hematuria) to the very severe (cancer of the urinary system). A variety of treatments are available depending on the cause.  Date Last Reviewed: 12/2/2016 © 2000-2017 [employer]. [address] [place] [address], [place] [address]. All rights reserved. This information is not intended as a substitute for professional medical care. Always follow your healthcare professional's instructions.        Hematuria: Possible Causes     Many things can lead to blood in the urine (hematuria). The blood may be seen with the eye (macroscopic or gross hematuria). Or it may only be seen when the urine is looked at under a microscope (microscopic hematuria). Some of the most common causes of blood in the urine are listed below. Often, no cause for the blood can be found. This is called idiopathic hematuria.  · Kidney or bladder stones are collections of crystals. They form in the urine. Stones may be found anywhere in the urinary tract. But they form most often in the kidneys or bladder. In addition to blood in the urine, they can cause severe pain.  · BPH stands for benign prostatic hyperplasia. It is enlargement of the prostate gland. It happens as men age. BPH often causes problems with urination. It sometimes causes blood in the urine.  · A urinary tract infection (UTI) is due to bacteria growing in the urinary tract. It can cause blood in the urine. Other symptoms include burning or pain with urination. You may need to urinate often or urgently. You may also have a fever.  · Damage to the urinary tract may cause blood in the urine. This damage may be due to a blow or accident. It may also result from the use of a urinary catheter. Very hard exercise may sometimes irritate the urinary tract and cause bleeding.  · Cancer may occur anywhere in the urinary tract. A tumor may sometimes cause no symptoms other than bleeding.     Other possible causes of bleeding include:  · Prostatitis (infection of the prostate  gland)  · Taking anticoagulants  · Blockage in the urinary tract  · Disease or inflammation of the kidney  · Cystic diseases of the kidneys  · Sickle cell anemia  · Vigorous exercise  · Endometriosis  Date Last Reviewed: 12/1/2016  © 8113-8442 [employer]. [address] [place] [address], [place] [address]. All rights reserved. This information is not intended as a substitute for professional medical care. Always follow your healthcare professional's instructions.

## 2018-11-06 NOTE — PROGRESS NOTES
[facility] Note  Staff: KADIE Yang    PCP: Kamilah Jackson  Cardiologist:  Dr. Cobb ([address]    Chief Complaint: [facility] ED F/UP:  Gross hematuria    Subjective:        HPI: Douglas Valle is a 63 y.o. male NEW PATIENT presents with GROSS HEMATURIA which began yesterday with no improvement in symptoms.  He currently denies dysuria or problems with urination.  He has been to ED at [facility] twice.  No urine cultures, no cytology and no imaging was performed at each visit.    Dr. Aguilar was called on pt last night while he was on call for consult advice via telephone, which ED sent pt home again knowing he already had an appt with our office today.    REVIEW OF SYSTEMS:  A comprehensive 10 system review was performed and is negative except as noted above in HPI    PMHx:  Past Medical History:   Diagnosis Date    Anemia     Anticoagulant long-term use     Atrial fibrillation     CAD (coronary artery disease)     DM2 (diabetes mellitus, type 2)     Elevated homocysteine     Encounter for blood transfusion     Gout     HLD (hyperlipidemia)     HTN (hypertension)     Hypothyroid     Myocardial infarction     Wears glasses      Kidney stones: No  Cataracts? None    PSHx:  Past Surgical History:   Procedure Laterality Date    CARDIAC SURGERY      CABG X 5  4-2000    CHOLECYSTECTOMY-LAPAROSCOPIC N/A 5/11/2015    [facility]    CORONARY ARTERY BYPASS GRAFT      ERCP N/A 5/15/2015    [facility] ENDO    ERCP N/A 2/16/2015    [facility]    stent          TONSILLECTOMY       Fam Hx:   malignancies: No   kidney stones: No     Soc Hx:  No tobacco user    Allergies:  Patient has no known allergies.    Medications: reviewed   **Anticoagulation: Yes - Pletal 100 mg one tablet daily  Xarelto 20 mg one tablet daily    Objective:     Vitals:    11/06/18 1326   BP: (!)  150/73   Pulse: 81   Resp: 18   Temp: 98.5 °F (36.9 °C)     General:WDWN in NAD  Eyes: PERRLA, normal conjunctiva  Respiratory: no increased work on breathing, clear to auscultation  Cardiovascular: regular rate and rhythm. No obvious extremity edema.  GI: palpation of masses. No tenderness. No hepatosplenomegaly to palpation.  Musculoskeletal: normal range of motion of bilateral upper extremities. Normal muscle strength and tone.  Skin: no obvious rashes or lesions. No tightening of skin noted.  Neurologic: CN grossly normal. Normal sensation.   Psychiatric: awake, alert and oriented x 3. Mood and affect normal. Cooperative.    LABS REVIEW:  UA today:  ABNORMAL    UCx:   Results for orders placed or performed during the hospital encounter of 09/20/18   Urine culture   Result Value Ref Range    Urine Culture, Routine No growth    Results for orders placed or performed during the hospital encounter of 05/14/15   Urine culture   Result Value Ref Range    Urine Culture, Routine No growth      Cr:   Lab Results   Component Value Date    CREATININE 2.2 (H) 11/05/2018     Assessment:       1. Hematuria, gross    2. Gross hematuria          Plan:   Gross hematuria:    1.  Urine culture, urine cytology to be performed.  2.  CT Urogram with and without contrast to be done.  3.  Vibra-tabs 100 mg 1 po BID x 14 days prescribed to pt during ov today.  4.  Pt and wife are going today to Dr. Cobb's office (cardiologist) to inform them of current gross hematuria situation and start process for cardiac clearance with blood thinners in case we plan for surgery at later date.    F/u with Urologist within the week for possible further treatment with Cystoscopy procedure.    [facility]: Pending    Valerie Patiño, ASHLIE-C

## 2018-11-07 ENCOUNTER — HOSPITAL ENCOUNTER (OUTPATIENT)
Dept: RADIOLOGY | Facility: HOSPITAL | Age: 63
Discharge: HOME | End: 2018-11-07
Attending: NURSE PRACTITIONER
Payer: MEDICARE

## 2018-11-07 ENCOUNTER — TELEPHONE (OUTPATIENT)
Dept: UROLOGY | Facility: CLINIC | Age: 63
End: 2018-11-07

## 2018-11-07 DIAGNOSIS — R31.0 GROSS HEMATURIA: ICD-10-CM

## 2018-11-07 PROCEDURE — 25500020 PHARM REV CODE 255

## 2018-11-07 PROCEDURE — 74178 CT ABD&PLV WO CNTR FLWD CNTR: CPT | Mod: TC

## 2018-11-07 PROCEDURE — 74178 CT ABD&PLV WO CNTR FLWD CNTR: CPT | Mod: 26,,, | Performed by: RADIOLOGY

## 2018-11-07 RX ORDER — SODIUM CHLORIDE 9 MG/ML
INJECTION, SOLUTION INTRAVENOUS
Status: DISPENSED
Start: 2018-11-07 | End: 2018-11-07

## 2018-11-07 RX ADMIN — IOHEXOL 75 ML: 350 INJECTION, SOLUTION INTRAVENOUS at 11:11

## 2018-11-07 NOTE — TELEPHONE ENCOUNTER
"Called patient to offer appointment with Dr. Looney 11/16. He says he cannot wait that long.  He is bleeding and having pain.  Advised that still waiting for CT and ucx.   He says he will "bleed out" by then.    Please advise if sooner appt available.   "

## 2018-11-07 NOTE — TELEPHONE ENCOUNTER
Called patient and gave ct results; recommended that he see Urologist and he didn't agree to the soonest appt of 11/16. Says he needed to be seen sooner, he was given the number to [facility] to see if he could be seen sooner there.

## 2018-11-07 NOTE — TELEPHONE ENCOUNTER
----- Message from Prasanna Looney MD sent at 11/7/2018 12:26 PM CST -----  Regarding: RE: Gross hematuria pt  Can see him at 2 next Friday 16th  ----- Message -----  From: ASHLIE Choi  Sent: 11/7/2018  10:10 AM  To: Yris Arreaga MD, #  Subject: Gross hematuria pt                               Pt was seen by me yesterday for gross hematuria since MOnday.  [facility] saw him twice with no workup, no even culture or imaging.  I started the workup yesterday and he needs to be seen.  Thanks.

## 2018-11-08 ENCOUNTER — OFFICE VISIT (OUTPATIENT)
Dept: UROLOGY | Facility: CLINIC | Age: 63
End: 2018-11-08
Payer: MEDICARE

## 2018-11-08 ENCOUNTER — HOSPITAL ENCOUNTER (OUTPATIENT)
Dept: RADIOLOGY | Facility: HOSPITAL | Age: 63
Discharge: HOME | End: 2018-11-08
Attending: UROLOGY
Payer: MEDICARE

## 2018-11-08 VITALS
HEIGHT: 73 IN | HEART RATE: 82 BPM | DIASTOLIC BLOOD PRESSURE: 73 MMHG | WEIGHT: 242.81 LBS | BODY MASS INDEX: 32.18 KG/M2 | SYSTOLIC BLOOD PRESSURE: 133 MMHG

## 2018-11-08 DIAGNOSIS — R31.0 GROSS HEMATURIA: ICD-10-CM

## 2018-11-08 DIAGNOSIS — R31.0 GROSS HEMATURIA: Primary | ICD-10-CM

## 2018-11-08 DIAGNOSIS — N48.89 PENILE BLEEDING: ICD-10-CM

## 2018-11-08 DIAGNOSIS — R82.90 ABNORMAL FINDING IN URINE: ICD-10-CM

## 2018-11-08 DIAGNOSIS — S39.94XA PENILE TRAUMA, INITIAL ENCOUNTER: ICD-10-CM

## 2018-11-08 LAB
BACTERIA UR CULT: NORMAL
BACTERIA UR CULT: NORMAL
BILIRUB SERPL-MCNC: ABNORMAL MG/DL
BLOOD URINE, POC: 250
COLOR, POC UA: YELLOW
GLUCOSE UR QL STRIP: ABNORMAL
KETONES UR QL STRIP: ABNORMAL
LEUKOCYTE ESTERASE URINE, POC: ABNORMAL
NITRITE, POC UA: ABNORMAL
PH, POC UA: 5
PROTEIN, POC: ABNORMAL
SPECIFIC GRAVITY, POC UA: 1.02
UROBILINOGEN, POC UA: ABNORMAL

## 2018-11-08 PROCEDURE — 99358 PROLONG SERVICE W/O CONTACT: CPT | Mod: S$GLB,,, | Performed by: UROLOGY

## 2018-11-08 PROCEDURE — 3075F SYST BP GE 130 - 139MM HG: CPT | Mod: CPTII,S$GLB,, | Performed by: UROLOGY

## 2018-11-08 PROCEDURE — 74450 X-RAY URETHRA/BLADDER: CPT | Mod: 26,,, | Performed by: RADIOLOGY

## 2018-11-08 PROCEDURE — 52000 CYSTOURETHROSCOPY: CPT | Mod: S$GLB,,, | Performed by: UROLOGY

## 2018-11-08 PROCEDURE — 87086 URINE CULTURE/COLONY COUNT: CPT

## 2018-11-08 PROCEDURE — 74450 X-RAY URETHRA/BLADDER: CPT | Mod: TC

## 2018-11-08 PROCEDURE — 51610 INJECTION FOR BLADDER X-RAY: CPT | Mod: ,,, | Performed by: RADIOLOGY

## 2018-11-08 PROCEDURE — 25500020 PHARM REV CODE 255

## 2018-11-08 PROCEDURE — 3008F BODY MASS INDEX DOCD: CPT | Mod: CPTII,S$GLB,, | Performed by: UROLOGY

## 2018-11-08 PROCEDURE — 99215 OFFICE O/P EST HI 40 MIN: CPT | Mod: 25,S$GLB,, | Performed by: UROLOGY

## 2018-11-08 PROCEDURE — 3078F DIAST BP <80 MM HG: CPT | Mod: CPTII,S$GLB,, | Performed by: UROLOGY

## 2018-11-08 PROCEDURE — 99999 PR PBB SHADOW E&M-EST. PATIENT-LVL V: CPT | Mod: PBBFAC,,, | Performed by: UROLOGY

## 2018-11-08 PROCEDURE — 81002 URINALYSIS NONAUTO W/O SCOPE: CPT | Mod: S$GLB,,, | Performed by: UROLOGY

## 2018-11-08 RX ADMIN — IOTHALAMATE MEGLUMINE 50 ML: 172 INJECTION URETERAL at 02:11

## 2018-11-08 NOTE — PROGRESS NOTES
[facility] Note  Staff: Yris Arreaga MD  PCP: Kamilah Jackson   Cardiologist:  Dr. Cobb ([facility]    [facility]  Gross hematuria    Subjective:        HPI: Douglas Valle is a 63 y.o. male     Hematuria twice, lasted one day about 2 months ago then again on 11/5/18. He was discharged from ER with plan for outpt f/u. Saw stanislaw on 11/6/18 who ordered a ctu and referred him to me. He is on xarelto and pletal. ctu done which showed no obvious causes for blood in urine.  Cytology pending, 45 pack year h/o smoking. On further investigation he states he has had mostly Penile bleeding not associated with hematuria.  No uti sx.  No burning. No h/o std.     On exam today I found copious amounts of old blood in his depends. Exam of his penis showed blood dripping from his penis. He had no penile edema. No scrotal edema.  I then placed a 20fr coude which went easily and urine was essentially clear. On further questioning he remembers trauma/ something hard hit his penis/groin the day prior to going to ER when all this started. I removed the catheter and then performed a cytoscopy to rule out any obvious trauma although I supected it was most likely bruising and bleeding without a urethral tear as extravasation of urine would result in edema. On cystoscopy today I could see that he had some bleeding in the pendulous urethra, distal to the bulb. On exam he stated that this is where he had trauma earlier. His bladder neck had some minor bleeding and his bladder had one small erythemaous spot on his left bladder wall that was either swanson trauma or cystitis or tumor, this will eventually need further eval.    I then sent him STAT to radiology after speaking to radiology and had them perform a RUG to confirm no extravasation as that would be an emergent procedure. The RUG was negative. Both  talked and reviewed his imaging. I then had him return to clinic  with a 22fr coude from the OR and I placed this easily.     Ua void: tr leuk/tr prot/250 blood- send for culture, no sx.   Urine history:  18 Multiple organisms, void: red/250 blood, cytology: pending  18 No cx, void: tr prot/3+blood, cytology: pending  18 Ng, void: 2+blood  5/16/15 ng      REVIEW OF SYSTEMS:  A comprehensive 10 system review was performed and is negative except as noted above in HPI    PMHx:  Past Medical History:   Diagnosis Date    Anemia     Anticoagulant long-term use     Atrial fibrillation     CAD (coronary artery disease)     DM2 (diabetes mellitus, type 2)     Elevated homocysteine     Encounter for blood transfusion     Gout     HLD (hyperlipidemia)     HTN (hypertension)     Hypothyroid     Myocardial infarction     Wears glasses      Kidney stones: No  Cataracts? None    PSHx:  Past Surgical History:   Procedure Laterality Date    CARDIAC SURGERY      CABG X 5  -    CHOLECYSTECTOMY-LAPAROSCOPIC N/A 2015    [facility]    CORONARY ARTERY BYPASS GRAFT      ERCP N/A 5/15/2015    [facility] ENDO    ERCP N/A 2015    [facility]    stent          TONSILLECTOMY       Cardiology: stents x 1, last one placed in , cabg in . afib on xarelto. PVD and on pletal.  Cardiologist:Dr.George Cobb - sees every 3 months  Nephrologist: karla    Colonoscopy: FOBT negative last year, never had a colonscopy-mother had colon cancer    Fam Hx:   malignancies: No. Gyn cancer: mother with ovarian cancer diagnosed at end of life. Mother with colon cancer.  Mother  a 80. Father  a 84  kidney stones: No     Soc Hx:  Quit smoking 2000. 45 pack year hx of smoking  No alcohol  Lives in [place]  , here with wife Poly  2 children  Owns a box company    Allergies:  Patient has no known allergies.    Medications: reviewed    Anticoagulation: Yes - Pletal 100 mg one tablet daily, Xarelto 20 mg one tablet daily. afib    Objective:     Vitals:    11/08/18 0906   BP: 133/73   Pulse: 82     General:WDWN in NAD  Eyes: PERRLA, normal conjunctiva  Respiratory: no increased work on breathing, clear to auscultation  Cardiovascular: regular rate and rhythm. No obvious extremity edema.  GI: palpation of masses. No tenderness. No hepatosplenomegaly to palpation.  Musculoskeletal: normal range of motion of bilateral upper extremities. Normal muscle strength and tone.  Skin: no obvious rashes or lesions. No tightening of skin noted.  Neurologic: CN grossly normal. Normal sensation.   Psychiatric: awake, alert and oriented x 3. Mood and affect normal. Cooperative.     exam:   Inspection of anus normal  No scrotal rashes, cysts or lesions  Epididymis normal in size, no tenderness  Testes normal and size, equal size bilaterally, no masses  Urethral meatus normal without discharge  Penis is circumcised . Large clots   ANSELMO: 45g gland without masses, tenderness. SV not palpable. Normal sphincter tone. No hemhorroids.  No bilateral inguinal hernias noted           LABS REVIEW:  BMP  Lab Results   Component Value Date     11/05/2018    K 4.2 11/05/2018     11/05/2018    CO2 22 (L) 11/05/2018    BUN 34 (H) 11/05/2018    CREATININE 2.2 (H) 11/05/2018    CALCIUM 9.8 11/05/2018    ANIONGAP 9 11/05/2018    ESTGFRAFRICA 35 (A) 11/05/2018    EGFRNONAA 31 (A) 11/05/2018     Lab Results   Component Value Date    WBC 7.90 11/05/2018    HGB 14.0 11/05/2018    HCT 41.3 11/05/2018    MCV 91 11/05/2018     11/05/2018       Pathology:  Cytology 11/6/18 and 11/5/18 pending    Radiology  Rug 11/8/18  No extravasation of urine    ctu 11/7/18 images reviewed  1. No obstructive uropathy, stones or other acute  abnormality.  2. Left renal simple cyst.  3. Prostatomegaly.  Correlate with PSA.  4. Coronary artery disease.    Assessment:       1. Gross  hematuria    2. Abnormal finding in urine     3. Penile bleeding    4. Penile trauma, initial encounter          Plan:     Gross hematuria secondary to his penile trauma   I have confirmed he has no extravasation and needs no immediate surgical treatment. I have placed a 22fr coude swanson and would like this to remain until next Tuesday to tamponade his urethral bleeding. I would like him to continue to hold his xarelto until the catheter has been out for at least 2 to 3 days as he could develop recurrent bleeding when catheter removed or he voids. He will be seeing his cardiologist Monday to confirm this is ok and obtain clearance for possible cystoscopy under anesthesia in the future.    Depending on cytology will need outpt flexible cystosocpy in 4-8 weeks after swanson removed to ensure no false lesions as a result of trauma from the catheter.     Still needs psa    Counseled when to return to ER, significant hematuria resulting in catheter not drainage. Expect angel-catheter bleeding but should improve off blood thinners.   Continue abx for 3 more days.    I spent 60 minutes with the patient of which more than half was spent in direct consultation with the patient in regards to our treatment and plan.  An additional 60 minutes was spent in treatment of this pt, 30 minutes direct face to face and 30 minutes indirect with consultation of other physicians and departments.     See separate cysto note.     .    [facility]: Pending    KADIE Rudolph

## 2018-11-09 ENCOUNTER — TELEPHONE (OUTPATIENT)
Dept: UROLOGY | Facility: CLINIC | Age: 63
End: 2018-11-09

## 2018-11-09 ENCOUNTER — HOSPITAL ENCOUNTER (EMERGENCY)
Facility: HOSPITAL | Age: 63
Discharge: HOME | End: 2018-11-09
Attending: EMERGENCY MEDICINE
Payer: MEDICARE

## 2018-11-09 VITALS
WEIGHT: 242 LBS | OXYGEN SATURATION: 97 % | RESPIRATION RATE: 16 BRPM | BODY MASS INDEX: 31.93 KG/M2 | HEART RATE: 82 BPM

## 2018-11-09 DIAGNOSIS — T83.038A LEAKAGE FROM URINARY CATHETER, INITIAL ENCOUNTER: Primary | ICD-10-CM

## 2018-11-09 PROCEDURE — 99283 EMERGENCY DEPT VISIT LOW MDM: CPT

## 2018-11-09 NOTE — TELEPHONE ENCOUNTER
Spoke with patient requesting tubing to connect from catheter to the bag. Informed him our clinic does not have the bags that come with the tubing. Patient states he is experiencing some burning when he urinates. Informed patient I will forward message to  but she will not return to clinic until Monday. Patient verbally voiced understanding.

## 2018-11-09 NOTE — TELEPHONE ENCOUNTER
Spoke with patient informed him per  to apply ky jelly to tip of penis where catheter is coming out twice a day and can take AZO OTC for burning. Verbally voiced understanding.

## 2018-11-09 NOTE — PROGRESS NOTES
[facility] Procedure Note- clinic  Date: 11/08/2018    Procedures: Flexible cystourethroscopy     Pre Procedure Diagnosis:penile bleeding, suspected penile trauma    Post Procedure Diagnosis: same, see below for findings    Surgeon: Yris Arreaga MD    Indications: Douglas Valle is a 63 y.o. male with significant penile bleeding. Urine from today reviewed. H&P reviewed.     Specimen: none    Anesthesia: 2% uro-jet lidocaine jelly for local analgesia    Procedure in detail:   Flexible cysto-urethroscopy was performed after consent was obtained.  The risks and benefits were explained.    2% lidocaine urojet was used for local analgesia.  The genitalia was prepped and draped in the sterile fashion with betadine.    The flexible scope was advanced into the urethra and into the bladder.  Bilateral ureteral orifice were evaluated and noted to be normal with clear efflux.  The bladder was completely surveyed in a systematic fashion and the cytoscope was retroflexed.  Cystoscopy findings as listed below.     The patient tolerated the procedure well without complication.    Findings: (pictures were not  uploaded into media)   On cystoscopy today I could see that he had some bleeding in the pendulous urethra, distal to the bulb. On exam he stated that this is where he had trauma earlier. His bladder neck had some minor bleeding and his bladder had one small erythemaous spot on his left bladder wall that was either swanson trauma or cystitis or tumor, this will eventually need further eval. Mild bilobar hypertrophy*    Assesment: urethral trauma just distal to bulb  Plan:  See progressnote   Yris Arreaga MD

## 2018-11-09 NOTE — TELEPHONE ENCOUNTER
----- Message from Romie Bhat sent at 11/9/2018  8:25 AM CST -----  Type: Needs Medical Advice    Who Called:  Patient    Best Call Back Number: [phone]  Additional Information: Patient calling.  States that he would like to come in and have an adjustment on his catheter.

## 2018-11-09 NOTE — TELEPHONE ENCOUNTER
----- Message from Romie Bhat sent at 11/9/2018  8:25 AM CST -----  Type: Needs Medical Advice    Who Called:  Patient    Best Call Back Number: [phone]  Additional Information: Patient calling.  States that he would like to come in and have an adjustment on his catheter.

## 2018-11-10 LAB — BACTERIA UR CULT: NO GROWTH

## 2018-11-10 NOTE — ED NOTES
"Pt presented to ED with c/o "swanson is too tight, its pulling". Upon assessment pt was leaking urine around catheter. Irrigated swanson with sterile water. No clots returned.  Bladder scanner shows zero residual in bladder. New leg applied and secured to left thigh. Pt discharged without complaints.  "

## 2018-11-10 NOTE — ED PROVIDER NOTES
Encounter Date: 11/9/2018    SCRIBE #1 NOTE: I, Radha Alvarez, am scribing for, and in the presence of, Dr. Healy .       History     Chief Complaint   Patient presents with    swanson cath problem     pt presented saying the swanson inserted by dr. arreaga was pulling. upon assessment pt was leaking urine around catheter       Time seen by provider: 6:11 PM on 11/09/2018    Douglas Valle is a 63 y.o. male with a hx of HTN, DM, CAD, A-fib, and MI who presents to the ED with c/o drainage around the swanson catheter. Pt states the swanson was inserted by Dr. Arreaga yesterday s/p urethral injury. Wife notes the catheter has been leaking since it was placed. Pt has been taking AZO which turns his urine orange. Today the catheter was pulling and causing penile pain. Pt denies penile pain and fever. NKDA noted.       The history is provided by the patient.     Review of patient's allergies indicates:  No Known Allergies  Past Medical History:   Diagnosis Date    Anemia     Anticoagulant long-term use     Atrial fibrillation     CAD (coronary artery disease)     DM2 (diabetes mellitus, type 2)     Elevated homocysteine     Encounter for blood transfusion     Gout     HLD (hyperlipidemia)     HTN (hypertension)     Hypothyroid     Myocardial infarction     Wears glasses      Past Surgical History:   Procedure Laterality Date    CARDIAC SURGERY      CABG X 5  4-2000    CHOLECYSTECTOMY-LAPAROSCOPIC N/A 5/11/2015    [facility] OR    CORONARY ARTERY BYPASS GRAFT      ERCP N/A 5/15/2015    [facility]iraldo [facility] at [facility] ENDO    ERCP N/A 2/16/2015    [facility]    stent          TONSILLECTOMY       Family History   Problem Relation Age of Onset    Diabetes Mother     Hypertension Mother     Heart disease Father      Social History     Tobacco Use    Smoking status: Former Smoker     Packs/day: 3.00     Years: 30.00      "Pack years: 90.00     Last attempt to quit: 2000     Years since quittin.6    Smokeless tobacco: Never Used   Substance Use Topics    Alcohol use: Yes     Comment: OCC    Drug use: No     Review of Systems   Constitutional: Negative for fever.        + for "catheter leakage"   HENT: Negative for sore throat.    Eyes: Negative for redness.   Respiratory: Negative for shortness of breath.    Cardiovascular: Negative for chest pain.   Gastrointestinal: Negative for nausea.   Genitourinary: Negative for dysuria.   Musculoskeletal: Negative for back pain.   Skin: Negative for rash.   Neurological: Negative for weakness.   Hematological: Does not bruise/bleed easily.       Physical Exam   There were no vitals filed for this visit.    Physical Exam    Nursing note and vitals reviewed.  Constitutional: He appears well-developed and well-nourished. He is not diaphoretic. No distress.   HENT:   Head: Normocephalic and atraumatic.   Eyes: EOM are normal. Pupils are equal, round, and reactive to light.   Neck: Normal range of motion. Neck supple.   Cardiovascular: Normal rate, regular rhythm, normal heart sounds and intact distal pulses. Exam reveals no gallop and no friction rub.    No murmur heard.  Pulmonary/Chest: Breath sounds normal. No respiratory distress. He has no wheezes. He has no rhonchi. He has no rales.   Abdominal: Soft. Bowel sounds are normal. There is no tenderness. There is no rebound and no guarding.   Genitourinary:   Genitourinary Comments: 22 fr catheter in place with orange urine noted out.    Musculoskeletal: Normal range of motion.   Neurological: He is alert and oriented to person, place, and time.   Skin: Skin is warm.   Psychiatric: He has a normal mood and affect. His behavior is normal. Judgment and thought content normal.         ED Course   Procedures  Labs Reviewed - No data to display       Imaging Results    None          Medical Decision Making:   History:   Old Medical Records: " I decided to obtain old medical records.  Initial Assessment:   63 year old male presented for evaluation of his urinary catheter.  Differential Diagnosis:   My differential diagnosis includes clogged catheter and malfunctioning catheter.     ED Management:  The patient was urgently evaluated in the emergency department, his evaluation was significant for an elderly male with a catheter in place.  The catheter is draining clear orange urine without difficulty of present.  There is minimal leakage noted around the catheter.  I do not believe the catheter needs to be changed at this present time.  The patient does not want the catheter changed at this time.  He has no signs of infection at this time.  He is stable for discharge to home.  He will follow with Dr. Arreaga as an outpatient for further care.            Scribe Attestation:   Scribe #1: I performed the above scribed service and the documentation accurately describes the services I performed. I attest to the accuracy of the note.        I, Dr. Avtar Case, personally performed the services described in this documentation. All medical record entries made by the scribe were at my direction and in my presence.  I have reviewed the chart and agree that the record reflects my personal performance and is accurate and complete. Avtar Case MD.  6:32 PM 11/09/2018          Clinical Impression:     1. Leakage from urinary catheter, initial encounter          Disposition:   Disposition: Discharged  Condition: Stable                        Avtar Case MD  11/09/18 2442

## 2018-11-13 ENCOUNTER — OFFICE VISIT (OUTPATIENT)
Dept: UROLOGY | Facility: CLINIC | Age: 63
End: 2018-11-13
Payer: MEDICARE

## 2018-11-13 ENCOUNTER — APPOINTMENT (OUTPATIENT)
Dept: LAB | Facility: HOSPITAL | Age: 63
End: 2018-11-13
Attending: UROLOGY
Payer: MEDICARE

## 2018-11-13 VITALS
WEIGHT: 242.06 LBS | BODY MASS INDEX: 32.08 KG/M2 | SYSTOLIC BLOOD PRESSURE: 145 MMHG | HEART RATE: 81 BPM | HEIGHT: 73 IN | DIASTOLIC BLOOD PRESSURE: 75 MMHG

## 2018-11-13 DIAGNOSIS — R82.90 ABNORMAL FINDING IN URINE: ICD-10-CM

## 2018-11-13 DIAGNOSIS — R31.0 GROSS HEMATURIA: ICD-10-CM

## 2018-11-13 DIAGNOSIS — Z12.5 ENCOUNTER FOR SCREENING FOR MALIGNANT NEOPLASM OF PROSTATE: ICD-10-CM

## 2018-11-13 DIAGNOSIS — S37.30XD TRAUMA OF URETHRA, SUBSEQUENT ENCOUNTER: Primary | ICD-10-CM

## 2018-11-13 LAB
BACTERIA #/AREA URNS HPF: ABNORMAL /HPF
BILIRUB UR QL STRIP: NEGATIVE
CLARITY UR: ABNORMAL
COLOR UR: YELLOW
GLUCOSE UR QL STRIP: NEGATIVE
HGB UR QL STRIP: ABNORMAL
KETONES UR QL STRIP: NEGATIVE
LEUKOCYTE ESTERASE UR QL STRIP: ABNORMAL
MICROSCOPIC COMMENT: ABNORMAL
NITRITE UR QL STRIP: NEGATIVE
PH UR STRIP: 6 [PH] (ref 5–8)
PROT UR QL STRIP: NEGATIVE
RBC #/AREA URNS HPF: >100 /HPF (ref 0–4)
SP GR UR STRIP: <=1.005 (ref 1–1.03)
URN SPEC COLLECT METH UR: ABNORMAL
UROBILINOGEN UR STRIP-ACNC: NEGATIVE EU/DL
WBC #/AREA URNS HPF: 5 /HPF (ref 0–5)

## 2018-11-13 PROCEDURE — 87086 URINE CULTURE/COLONY COUNT: CPT

## 2018-11-13 PROCEDURE — 3078F DIAST BP <80 MM HG: CPT | Mod: CPTII,S$GLB,, | Performed by: UROLOGY

## 2018-11-13 PROCEDURE — 99214 OFFICE O/P EST MOD 30 MIN: CPT | Mod: 25,S$GLB,, | Performed by: UROLOGY

## 2018-11-13 PROCEDURE — 99999 PR PBB SHADOW E&M-EST. PATIENT-LVL V: CPT | Mod: PBBFAC,,, | Performed by: UROLOGY

## 2018-11-13 PROCEDURE — 51700 IRRIGATION OF BLADDER: CPT | Mod: S$GLB,,, | Performed by: UROLOGY

## 2018-11-13 PROCEDURE — 81000 URINALYSIS NONAUTO W/SCOPE: CPT

## 2018-11-13 PROCEDURE — 3077F SYST BP >= 140 MM HG: CPT | Mod: CPTII,S$GLB,, | Performed by: UROLOGY

## 2018-11-13 PROCEDURE — 3008F BODY MASS INDEX DOCD: CPT | Mod: CPTII,S$GLB,, | Performed by: UROLOGY

## 2018-11-13 NOTE — PROGRESS NOTES
[facility] Note  Staff: Yris Arreaga MD  PCP: Kamilah Jackson   Cardiologist:  Dr. Cobb ([facility]    [facility] F/UP:  Gross hematuria    Subjective:        HPI: Douglas Valle is a 63 y.o. male     Hematuria twice, lasted one day about 2 months ago then again on 11/5/18. He was discharged from ER with plan for outpt f/u. Saw stanisalw on 11/6/18 who ordered a ctu and referred him to me. He is on xarelto and pletal. ctu done which showed no obvious causes for blood in urine.  Cytology pending, 45 pack year h/o smoking. On further investigation he states he has had mostly Penile bleeding not associated with hematuria.  No uti sx.  No burning. No h/o std.     On exam today I found copious amounts of old blood in his depends. Exam of his penis showed blood dripping from his penis. He had no penile edema. No scrotal edema.  I then placed a 20fr coude which went easily and urine was essentially clear. On further questioning he remembers trauma/ something hard hit his penis/groin the day prior to going to ER when all this started. I removed the catheter and then performed a cytoscopy to rule out any obvious trauma although I supected it was most likely bruising and bleeding without a urethral tear as extravasation of urine would result in edema. On cystoscopy today I could see that he had some bleeding in the pendulous urethra, distal to the bulb. On exam he stated that this is where he had trauma earlier. His bladder neck had some minor bleeding and his bladder had one small erythemaous spot on his left bladder wall that was either swanson trauma or cystitis or tumor, this will eventually need further eval.    I then sent him STAT to radiology after speaking to radiology and had them perform a RUG to confirm no extravasation as that would be an emergent procedure. The RUG was negative. Both  talked and reviewed his imaging. I then had him return to clinic  with a 22fr coude from the OR and I placed this easily. He has had no bleeding around his catheter since I've seen him. He saw  Monday, yesterday, and  Was ok to stay off blood thinners for 2 more days and cleared to be off he needed to for any biopsies in the future.     Ua void:  Sent for ua and culture- no sx of uti  Urine history:  18 Ng, void: tr leuk/tr prot/250 blood  18 Multiple organisms, void: red/250 blood, cytology: negative  18 No cx, void: tr prot/3+blood, cytology: negative  18 Ng, void: 2+blood  5/16/15 ng      REVIEW OF SYSTEMS:  A comprehensive 10 system review was performed and is negative except as noted above in HPI    PMHx:  Past Medical History:   Diagnosis Date    Anemia     Anticoagulant long-term use     Atrial fibrillation     CAD (coronary artery disease)     DM2 (diabetes mellitus, type 2)     Elevated homocysteine     Encounter for blood transfusion     Gout     HLD (hyperlipidemia)     HTN (hypertension)     Hypothyroid     Myocardial infarction     Wears glasses      Kidney stones: No  Cataracts? None    PSHx:  Past Surgical History:   Procedure Laterality Date    CARDIAC SURGERY      CABG X 5  4-    CHOLECYSTECTOMY-LAPAROSCOPIC N/A 2015    [facility] at [facility] OR    CORONARY ARTERY BYPASS GRAFT      ERCP N/A 5/15/2015    Performed by Prasanna [facility]iraldo [facility] at [facility] ENDO    ERCP N/A 2015    Per[facility]ino [facility] at [facility]O    stent          TONSILLECTOMY       Cardiology: stents x 1, last one placed in , cabg in . afib on xarelto. PVD and on pletal.  Cardiologist:Dr.George Cbob - sees every 3 months  Nephrologist: karla    Colonoscopy: FOBT negative last year, never had a colonscopy-mother had colon cancer    Fam Hx:   malignancies: No. Gyn cancer: mother with ovarian cancer diagnosed at end of life. Mother with colon cancer.  Mother  a 80. Father  a 84  kidney  stones: No     Soc Hx:  Quit smoking April 2000. 45 pack year hx of smoking  No alcohol  Lives in [place]  , here with wife Poly  2 children  Owns a box company    Allergies:  Patient has no known allergies.    Medications: reviewed   Anticoagulation: Yes - Pletal 100 mg one tablet daily, Xarelto 20 mg one tablet daily. afib    Objective:     Vitals:    11/13/18 1039   BP: (!) 145/75   Pulse: 81     General:WDWN in NAD  Eyes: PERRLA, normal conjunctiva  Respiratory: no increased work on breathing, clear to auscultation  Cardiovascular: regular rate and rhythm. No obvious extremity edema.  GI: palpation of masses. No tenderness. No hepatosplenomegaly to palpation.  Musculoskeletal: normal range of motion of bilateral upper extremities. Normal muscle strength and tone.  Skin: no obvious rashes or lesions. No tightening of skin noted.  Neurologic: CN grossly normal. Normal sensation.   Psychiatric: awake, alert and oriented x 3. Mood and affect normal. Cooperative.     exam 11/6/18:   Inspection of anus normal  No scrotal rashes, cysts or lesions  Epididymis normal in size, no tenderness  Testes normal and size, equal size bilaterally, no masses  Urethral meatus normal without discharge  Penis is circumcised . Large clots   ANSELMO: 45g gland without masses, tenderness. SV not palpable. Normal sphincter tone. No hemhorroids.  No bilateral inguinal hernias noted     Bladder irrigation:  120cc normal saline placed through swanson, pt felt full and swanson removed. He voided clear yellow urine after. Some bleeding from the meatus initially but voided again.       LABS REVIEW:  BMP  Lab Results   Component Value Date     11/05/2018    K 4.2 11/05/2018     11/05/2018    CO2 22 (L) 11/05/2018    BUN 34 (H) 11/05/2018    CREATININE 2.2 (H) 11/05/2018    CALCIUM 9.8 11/05/2018    ANIONGAP 9 11/05/2018    ESTGFRAFRICA 35 (A) 11/05/2018    EGFRNONAA 31 (A) 11/05/2018     Lab Results   Component Value Date    WBC  7.90 11/05/2018    HGB 14.0 11/05/2018    HCT 41.3 11/05/2018    MCV 91 11/05/2018     11/05/2018       Pathology:  Cytology 11/6/18 and 11/5/18: negative for cancer cells    Radiology  Rug 11/8/18  No extravasation of urine    ctu 11/7/18 images reviewed  1. No obstructive uropathy, stones or other acute  abnormality.  2. Left renal simple cyst.  3. Prostatomegaly.  Correlate with PSA.  4. Coronary artery disease.    Assessment:       1. Trauma of urethra, subsequent encounter    2. Abnormal finding in urine           Plan:     Gross hematuria secondary to his penile trauma  Cytology negative x 2. I think blood in urine was from trauma. Voided clear urine here today after filled bladder and pulled catheter.    Will plan to look in the bladder in 6 to 8 weeks. Left lateral wall had small lesion that I felt was likely from catheter trauma.    Will return this afternoon if having gross hematuria again or bleeding from penis. Will start blood thinners in 2d to ensure everything healed.     Still needs psa and can get done to prior to cystoscopy on 27th, urine sample 1 week prior     Counseled when to return to ER, significant hematuria resulting in catheter not drainage. Expect angel-catheter bleeding but should improve off blood thinners.   Continue abx for 3 more days.    [facility]: active    Yris Arreaga,

## 2018-11-13 NOTE — PATIENT INSTRUCTIONS
Gross hematuria secondary to his penile trauma  Cytology negative x 2. I think blood in urine was from trauma. Voided clear urine here today after filled bladder and pulled catheter.    Will plan to look in the bladder in 6 to 8 weeks. Left lateral wall had small lesion that I felt was likely from catheter trauma.    Will return this afternoon if having gross hematuria again or bleeding from penis. Will start blood thinners in 2d to ensure everything healed.     Still needs psa  (blood test) and can get done to prior to cystoscopy on dec 27th       Counseled when to return to ER, significant hematuria resulting in catheter not drainage. Expect angel-catheter bleeding but should improve off blood thinners.   Continue abx for 3 more days.

## 2018-11-14 LAB — BACTERIA UR CULT: NO GROWTH

## 2018-12-20 DIAGNOSIS — R82.998 CELLS AND CASTS IN URINE: Primary | ICD-10-CM

## 2018-12-21 ENCOUNTER — LAB VISIT (OUTPATIENT)
Dept: LAB | Facility: HOSPITAL | Age: 63
End: 2018-12-21
Attending: UROLOGY
Payer: MEDICARE

## 2018-12-21 DIAGNOSIS — Z12.5 ENCOUNTER FOR SCREENING FOR MALIGNANT NEOPLASM OF PROSTATE: ICD-10-CM

## 2018-12-21 DIAGNOSIS — S37.30XD TRAUMA OF URETHRA, SUBSEQUENT ENCOUNTER: ICD-10-CM

## 2018-12-21 DIAGNOSIS — R82.90 ABNORMAL FINDING IN URINE: ICD-10-CM

## 2018-12-21 LAB — COMPLEXED PSA SERPL-MCNC: 3.3 NG/ML

## 2018-12-21 PROCEDURE — 84153 ASSAY OF PSA TOTAL: CPT

## 2018-12-21 PROCEDURE — 36415 COLL VENOUS BLD VENIPUNCTURE: CPT

## 2018-12-27 ENCOUNTER — TELEPHONE (OUTPATIENT)
Dept: UROLOGY | Facility: CLINIC | Age: 63
End: 2018-12-27

## 2018-12-27 ENCOUNTER — HOSPITAL ENCOUNTER (OUTPATIENT)
Facility: AMBULARY SURGERY CENTER | Age: 63
Discharge: HOME | End: 2018-12-27
Attending: UROLOGY | Admitting: UROLOGY
Payer: MEDICARE

## 2018-12-27 DIAGNOSIS — Z12.5 ENCOUNTER FOR SCREENING FOR MALIGNANT NEOPLASM OF PROSTATE: ICD-10-CM

## 2018-12-27 DIAGNOSIS — S37.30XD TRAUMA OF URETHRA, SUBSEQUENT ENCOUNTER: ICD-10-CM

## 2018-12-27 DIAGNOSIS — R31.0 GROSS HEMATURIA: ICD-10-CM

## 2018-12-27 PROBLEM — S37.30XA TRAUMA OF URETHRA: Status: ACTIVE | Noted: 2018-12-27

## 2018-12-27 LAB
BACTERIA SPEC CULT: NORMAL
BILIRUB SERPL-MCNC: NORMAL MG/DL
BLOOD URINE, POC: NORMAL
CASTS: NORMAL
COLOR, POC UA: NORMAL
CRYSTALS: NORMAL
GLUCOSE UR QL STRIP: NORMAL
KETONES UR QL STRIP: NORMAL
LEUKOCYTE ESTERASE URINE, POC: NORMAL
NITRITE, POC UA: NORMAL
PH, POC UA: 6
PROTEIN, POC: NORMAL
RBC CELLS COUNTED: NORMAL
SPECIFIC GRAVITY, POC UA: 1.01
UROBILINOGEN, POC UA: NORMAL
WHITE BLOOD CELLS: NORMAL

## 2018-12-27 PROCEDURE — 52224 CYSTOSCOPY AND TREATMENT: CPT | Performed by: UROLOGY

## 2018-12-27 PROCEDURE — 52224 CYSTOSCOPY AND TREATMENT: CPT | Mod: ,,, | Performed by: UROLOGY

## 2018-12-27 RX ORDER — CIPROFLOXACIN 500 MG/1
500 TABLET ORAL ONCE
Status: COMPLETED | OUTPATIENT
Start: 2018-12-27 | End: 2018-12-27

## 2018-12-27 RX ORDER — WATER 1000 ML/1000ML
INJECTION, SOLUTION INTRAVENOUS
Status: DISCONTINUED | OUTPATIENT
Start: 2018-12-27 | End: 2018-12-27 | Stop reason: HOSPADM

## 2018-12-27 RX ORDER — LIDOCAINE HYDROCHLORIDE 20 MG/ML
JELLY TOPICAL
Status: DISCONTINUED | OUTPATIENT
Start: 2018-12-27 | End: 2018-12-27 | Stop reason: HOSPADM

## 2018-12-27 RX ADMIN — CIPROFLOXACIN 500 MG: 500 TABLET ORAL at 11:12

## 2018-12-27 NOTE — OP NOTE
"[facility] Procedure Note- ASC  Date: 12/27/2018    Procedures: Flexible cystourethroscopy and bladder fulguration    Pre Procedure Diagnosis   Gross hematuria  Urethral trauma    Post Procedure Diagnosis: same, see below for findings    Surgeon: Yris Arreaga MD    Indications: Douglas Valle is a 63 y.o. male with above pre-procedure diagnosis. Urine from today reviewed. H&P reviewed.      Specimen:none     Anesthesia: 2% uro-jet lidocaine jelly for local analgesia    Procedure in detail:   Flexible cysto-urethroscopy was performed after consent was obtained.  The risks and benefits were explained.    2% lidocaine urojet was used for local analgesia.  The genitalia was prepped and draped in the sterile fashion with betadine.    The flexible scope was advanced into the urethra and into the bladder.  Bilateral ureteral orifice were evaluated and noted to be normal with clear efflux.  The bladder was completely surveyed in a systematic fashion and the cytoscope was retroflexed.  Cystoscopy findings as listed below.     Decided to hold off on bladder biopsy bc he is on xarelto and low suspicion for malignancy or high grade tumor. More likely irritation from stent that is healing. Fulgurated this instead, very small lesion, <0.5cm.     The patient tolerated the procedure well without complication.    Findings: (pictures were  uploaded into media)  1. Urethra findings - no residual bruising or trauma seen in the bulbar urethra   2. Prostate findings - moderate lobe hypertrophy, small intravesical and subcervical median lobe  3. Bladder findings - minimal trabeculations, no  diverticulum  4. Other findings: on the posterior bladder wall towards dome he had inflammation still from recent stent and the "lesion on his left lateral wall" was smaller. I decided to fulgurate this instead of biopsy bc of his use of xarelto and low risk of dz.   No Bladder biopsy performed    Assesment: Douglas Valle is a " 63 y.o. male with gross hematuria likely from urethral trauma with smoking history and small bladder lesion that was likely trauma but fulgurated today  Plan:  Return in 3 months and will plan for repeat cystoscopy at that point to ensure no lesions and no stricture.   Will see if blood In urine resolved by then.   Cipro in recovery    Yris Arreaga MD

## 2018-12-27 NOTE — DISCHARGE INSTRUCTIONS
Cystoscopy    Cystoscopy is a procedure that lets your doctor look directly inside your urethra and bladder. It can be used to:  · Help diagnose a problem with your urethra, bladder, or kidneys.  · Take a sample (biopsy) of bladder or urethral tissue.  · Treat certain problems (such as removing kidney stones).  · Place a stent to bypass an obstruction.  · Take special X-rays of the kidneys.  Based on the findings, your doctor may recommend other tests or treatments.  What is a cystoscope?  A cystoscope is a telescope-like instrument that contains lenses and fiberoptics (small glass wires that make bright light). The cystoscope may be straight and rigid, or flexible to bend around curves in the urethra. The doctor may look directly into the cystoscope, or project the image onto a monitor.  Getting ready  · Ask your doctor if you should stop taking any medicines before the procedure.  · Ask whether you should avoid eating or drinking anything after midnight before the procedure.  · Follow any other instructions your doctor gives you.  Tell your doctor before the exam if you:  · Take any medicines, such as aspirin or blood thinners  · Have allergies to any medicines  · Are pregnant   The procedure  Cystoscopy is done in the doctors office, surgery center, or hospital. The doctor and a nurse are present during the procedure. It takes only a few minutes, longer if a biopsy, X-ray, or treatment needs to be done.  During the procedure:  · You lie on an exam table on your back, knees bent and legs apart. You are covered with a drape.  · Your urethra and the area around it are washed. Anesthetic jelly may be applied to numb the urethra. Other pain medicine is usually not needed. In some cases, you may be offered a mild sedative to help you relax. If a more extensive procedure is to be done, such as a biopsy or kidney stone removal, general anesthesia may be needed.  · The cystoscope is inserted. A sterile fluid is put  into the bladder to expand it. You may feel pressure from this fluid.  · When the procedure is done, the cystoscope is removed.  After the procedure  If you had a sedative, general anesthesia, or spinal anesthesia, you must have someone drive you home. Once youre home:  · Drink plenty of fluids.  · You may have burning or light bleeding when you urinate--this is normal.  · Medicines may be prescribed to ease any discomfort or prevent infection. Take these as directed.  · Call your doctor if you have heavy bleeding or blood clots, burning that lasts more than a day, a fever over 100°F  (38° C), or trouble urinating.  Date Last Reviewed: 1/1/2017  © 2665-5667 [employer]. [address] [place] [address], [place] [address]. All rights reserved. This information is not intended as a substitute for professional medical care. Always follow your healthcare professional's instructions.               After the procedure    · Drink plenty of fluids.  · You may have burning or light bleeding when you urinate--this is normal.  · Medications may be prescribed to ease any discomfort or prevent infection. Take these as directed.  · Call your doctor if you have heavy bleeding or blood clots, burning that lasts more than a day, a fever over 100°F  (38° C), or trouble urinating.

## 2018-12-27 NOTE — DISCHARGE SUMMARY
[facility]  Urology  Discharge Note - Short Stay      Patient Name: Douglas Valle  MRN: [id number]  Discharge Date and Time:  12/27/2018 11:07 AM  Attending Physician: Yris Arreaga,*   Discharging Provider: Yris Arreaga MD  Primary Care Physician: Kamilah Jackson MD    Final Active Diagnoses:    Diagnosis Date Noted POA    Trauma of urethra [S37.30XA] 12/27/2018 Yes      Problems Resolved During this Admission:       Final Diagnoses: Same as principal problem.    Hospital Course: Patient was admitted for an outpatient procedure and tolerated the procedure well with no complications.*    Procedure(s) (LRB):  CYSTOSCOPY (N/A)     Indwelling Lines/Drains at time of discharge:   Lines/Drains/Airways          None          Discharged Condition: good    Disposition: home    Follow Up:      Patient Instructions:   No discharge procedures on file.    Medications:  Reconciled Home Medications:      Medication List      CONTINUE taking these medications    allopurinol 300 MG tablet  Commonly known as:  ZYLOPRIM  Take 300 mg by mouth once daily.     amiodarone 200 MG Tab  Commonly known as:  PACERONE  Take 200 mg by mouth every Mon, Wed, Fri.     cilostazol 100 MG Tab  Commonly known as:  PLETAL  Take 100 mg by mouth once daily.     fenofibrate micronized 134 MG Cap  Commonly known as:  LOFIBRA  Take 134 mg by mouth daily with breakfast.     folic acid 1 MG tablet  Commonly known as:  FOLVITE  Take 1 mg by mouth once daily.     lisinopril 10 MG tablet  Take 10 mg by mouth once daily.     metFORMIN 500 MG tablet  Commonly known as:  GLUCOPHAGE  Take 500 mg by mouth daily with breakfast.     metoprolol succinate 50 MG 24 hr tablet  Commonly known as:  TOPROL-XL  Take 50 mg by mouth once daily.     pantoprazole 40 MG tablet  Commonly known as:  PROTONIX  Take 40 mg by mouth once daily.     rivaroxaban 20 mg Tab  Commonly known as:  XARELTO  Take 1 tablet (20 mg total) by mouth once daily.             Discharge Procedure Orders (must include Diet, Follow-up, Activity):  No discharge procedures on file.         Yris Arreaga MD  Urology  [facility]

## 2018-12-27 NOTE — H&P
[facility] H&P Clinic Note  Staff: Yris Arreaga MD  PCP: Kamilah Jackson   Cardiologist:  Dr. Cobb ([address]     [facility]:  Gross hematuria     Subjective:        HPI: Douglas Valle is a 63 y.o. male      Hematuria twice, lasted one day about 2 months ago then again on 11/5/18. He was discharged from ER with plan for outpt f/u. Saw stanislaw on 11/6/18 who ordered a ctu and referred him to me. He is on xarelto and pletal. ctu done which showed no obvious causes for blood in urine.  Cytology pending, 45 pack year h/o smoking. On further investigation he states he has had mostly Penile bleeding not associated with hematuria.  No uti sx.  No burning. No h/o std.      On exam 11/8/18 I found copious amounts of old blood in his depends. Exam of his penis showed blood dripping from his penis. He had no penile edema. No scrotal edema.  I then placed a 20fr coude which went easily and urine was essentially clear. On further questioning he remembers trauma/ something hard hit his penis/groin the day prior to going to ER when all this started. I removed the catheter and then performed a cytoscopy to rule out any obvious trauma although I supected it was most likely bruising and bleeding without a urethral tear as extravasation of urine would result in edema. On cystoscopy today I could see that he had some bleeding in the pendulous urethra, distal to the bulb. On exam he stated that this is where he had trauma earlier. His bladder neck had some minor bleeding and his bladder had one small erythemaous spot on his left bladder wall that was either swanson trauma or cystitis or tumor, this will eventually need further eval.     I then sent him STAT to radiology after speaking to radiology and had them perform a RUG to confirm no extravasation as that would be an emergent procedure. The RUG was negative. Both  talked and reviewed his imaging. I then had him return to clinic  with a 22fr coude from the OR and I placed this easily. He has had no bleeding around his catheter since I've seen him. He saw  Monday, yesterday, and  Was ok to stay off blood thinners for 2 more days and cleared to be off he needed to for any biopsies in the future.     Here today for cystoscopy to re-eval urethral lesion and make sure urethra has healed and no lesions on bladder.      Ua void:   tr wbc, tr bld  Urine history:  12/21/18 Ng, void: n/a  11/13/18 Ng, void: 3+bld/tr leuk, >100 RBC and 5 wbc  11/8/18            Ng, void: tr leuk/tr prot/250 blood  11/6/18            Multiple organisms, void: red/250 blood, cytology: negative  11/5/18            No cx, void: tr prot/3+blood, cytology: negative  9/20/18            Ng, void: 2+blood  5/16/15            ng        REVIEW OF SYSTEMS:  A comprehensive 10 system review was performed and is negative except as noted above in HPI     PMHx:       Past Medical History:   Diagnosis Date    Anemia      Anticoagulant long-term use      Atrial fibrillation      CAD (coronary artery disease)      DM2 (diabetes mellitus, type 2)      Elevated homocysteine      Encounter for blood transfusion      Gout      HLD (hyperlipidemia)      HTN (hypertension)      Hypothyroid      Myocardial infarction      Wears glasses        Kidney stones: No  Cataracts? None     PSHx:        Past Surgical History:   Procedure Laterality Date    CARDIAC SURGERY         CABG X 5  4-2000    CHOLECYSTECTOMY-LAPAROSCOPIC N/A 5/11/2015     [facility] by [facility] at [facility] OR    CORONARY ARTERY BYPASS GRAFT        ERCP N/A 5/15/2015     Performed by Prasanna [facility]iraldo [facility] at [facility] ENDO    ERCP N/A 2/16/2015     [facility] at [facility]O    stent             TONSILLECTOMY          Cardiology: stents x 1, last one placed in 2005, cabg in 2001. afib on xarelto. PVD and on pletal.  Cardiologist:Dr.George Cobb - sees every 3  months  Nephrologist: karla     Colonoscopy: FOBT negative last year, never had a colonscopy-mother had colon cancer     Fam Hx:   malignancies: No. Gyn cancer: mother with ovarian cancer diagnosed at end of life. Mother with colon cancer.  Mother  a 80. Father  a 84  kidney stones: No      Soc Hx:  Quit smoking 2000. 45 pack year hx of smoking  No alcohol  Lives in [place]  , here with wife Poly  2 children  Owns a box company     Allergies:  Patient has no known allergies.     Medications: reviewed   Anticoagulation: Yes - Pletal 100 mg one tablet daily, Xarelto 20 mg one tablet daily. afib     Objective:      Vitals:    18 1025   BP: 105/70   Pulse: 61   Resp: 20   Temp:          General:WDWN in NAD  Eyes: PERRLA, normal conjunctiva  Respiratory: no increased work on breathing, clear to auscultation  Cardiovascular: regular rate and rhythm. No obvious extremity edema.  GI: palpation of masses. No tenderness. No hepatosplenomegaly to palpation.  Musculoskeletal: normal range of motion of bilateral upper extremities. Normal muscle strength and tone.  Skin: no obvious rashes or lesions. No tightening of skin noted.  Neurologic: CN grossly normal. Normal sensation.   Psychiatric: awake, alert and oriented x 3. Mood and affect normal. Cooperative.      exam 18:   Inspection of anus normal  No scrotal rashes, cysts or lesions  Epididymis normal in size, no tenderness  Testes normal and size, equal size bilaterally, no masses  Urethral meatus normal without discharge  Penis is circumcised . Large clots   ANSELMO: 45g gland without masses, tenderness. SV not palpable. Normal sphincter tone. No hemhorroids.  No bilateral inguinal hernias noted      Bladder irrigation 18:  120cc normal saline placed through swanson, pt felt full and swanson removed. He voided clear yellow urine after. Some bleeding from the meatus initially but voided again.         LABS REVIEW:  BMP        Lab Results    Component Value Date      11/05/2018     K 4.2 11/05/2018      11/05/2018     CO2 22 (L) 11/05/2018     BUN 34 (H) 11/05/2018     CREATININE 2.2 (H) 11/05/2018     CALCIUM 9.8 11/05/2018     ANIONGAP 9 11/05/2018     ESTGFRAFRICA 35 (A) 11/05/2018     EGFRNONAA 31 (A) 11/05/2018            Lab Results   Component Value Date     WBC 7.90 11/05/2018     HGB 14.0 11/05/2018     HCT 41.3 11/05/2018     MCV 91 11/05/2018      11/05/2018      Lab Results   Component Value Date    PSA 3.3 12/21/2018          Pathology:  Cytology 11/6/18 and 11/5/18: negative for cancer cells     Radiology  Rug 11/8/18  No extravasation of urine     ctu 11/7/18 images reviewed  1. No obstructive uropathy, stones or other acute  abnormality.  2. Left renal simple cyst.  3. Prostatomegaly.  Correlate with PSA.  4. Coronary artery disease.     Assessment:       1. Trauma of urethra, subsequent encounter    2. Abnormal finding in urine         Lesion on bladder wall;    Plan:      Gross hematuria secondary to his penile trauma, abnormal cystoscopy   Cytology negative x 2. I think blood in urine was from trauma. Voided clear urine here today after filled bladder and pulled catheter.     Will plan to look in the bladder today again. Left lateral wall had small lesion that I felt was likely from catheter trauma.  Re eval urethral trauma     psa 3.3 which is on the higher end of normal. Need to continue to monitor    This patient has been cleared for surgery in ambulatory surgical facility.         [facility]: active

## 2019-01-02 VITALS
RESPIRATION RATE: 20 BRPM | SYSTOLIC BLOOD PRESSURE: 108 MMHG | OXYGEN SATURATION: 100 % | HEART RATE: 57 BPM | DIASTOLIC BLOOD PRESSURE: 70 MMHG | HEIGHT: 73 IN | TEMPERATURE: 98 F | BODY MASS INDEX: 32.07 KG/M2 | WEIGHT: 242 LBS

## 2019-03-25 ENCOUNTER — OFFICE VISIT (OUTPATIENT)
Dept: UROLOGY | Facility: CLINIC | Age: 64
End: 2019-03-25
Payer: MEDICARE

## 2019-03-25 VITALS
HEART RATE: 61 BPM | DIASTOLIC BLOOD PRESSURE: 58 MMHG | BODY MASS INDEX: 35.23 KG/M2 | WEIGHT: 260.13 LBS | SYSTOLIC BLOOD PRESSURE: 100 MMHG | HEIGHT: 72 IN

## 2019-03-25 DIAGNOSIS — S39.94XS: ICD-10-CM

## 2019-03-25 DIAGNOSIS — N52.9 ERECTILE DYSFUNCTION, UNSPECIFIED ERECTILE DYSFUNCTION TYPE: ICD-10-CM

## 2019-03-25 DIAGNOSIS — R31.0 GROSS HEMATURIA: Primary | ICD-10-CM

## 2019-03-25 DIAGNOSIS — R97.20 ELEVATED PSA: ICD-10-CM

## 2019-03-25 LAB
BILIRUB SERPL-MCNC: NORMAL MG/DL
BLOOD URINE, POC: NORMAL
COLOR, POC UA: NORMAL
GLUCOSE UR QL STRIP: NORMAL
KETONES UR QL STRIP: NORMAL
LEUKOCYTE ESTERASE URINE, POC: NORMAL
NITRITE, POC UA: NORMAL
PH, POC UA: 5.5
PROTEIN, POC: NORMAL
SPECIFIC GRAVITY, POC UA: 1.03
UROBILINOGEN, POC UA: NORMAL

## 2019-03-25 PROCEDURE — 81002 URINALYSIS NONAUTO W/O SCOPE: CPT | Mod: S$GLB,,, | Performed by: UROLOGY

## 2019-03-25 PROCEDURE — 3074F SYST BP LT 130 MM HG: CPT | Mod: CPTII,S$GLB,, | Performed by: UROLOGY

## 2019-03-25 PROCEDURE — 99999 PR PBB SHADOW E&M-EST. PATIENT-LVL IV: ICD-10-PCS | Mod: PBBFAC,,, | Performed by: UROLOGY

## 2019-03-25 PROCEDURE — 3008F BODY MASS INDEX DOCD: CPT | Mod: CPTII,S$GLB,, | Performed by: UROLOGY

## 2019-03-25 PROCEDURE — 99215 PR OFFICE/OUTPT VISIT, EST, LEVL V, 40-54 MIN: ICD-10-PCS | Mod: 25,S$GLB,, | Performed by: UROLOGY

## 2019-03-25 PROCEDURE — 3074F PR MOST RECENT SYSTOLIC BLOOD PRESSURE < 130 MM HG: ICD-10-PCS | Mod: CPTII,S$GLB,, | Performed by: UROLOGY

## 2019-03-25 PROCEDURE — 3008F PR BODY MASS INDEX (BMI) DOCUMENTED: ICD-10-PCS | Mod: CPTII,S$GLB,, | Performed by: UROLOGY

## 2019-03-25 PROCEDURE — 3078F PR MOST RECENT DIASTOLIC BLOOD PRESSURE < 80 MM HG: ICD-10-PCS | Mod: CPTII,S$GLB,, | Performed by: UROLOGY

## 2019-03-25 PROCEDURE — 99215 OFFICE O/P EST HI 40 MIN: CPT | Mod: 25,S$GLB,, | Performed by: UROLOGY

## 2019-03-25 PROCEDURE — 81002 POCT URINE DIPSTICK WITHOUT MICROSCOPE: ICD-10-PCS | Mod: S$GLB,,, | Performed by: UROLOGY

## 2019-03-25 PROCEDURE — 3078F DIAST BP <80 MM HG: CPT | Mod: CPTII,S$GLB,, | Performed by: UROLOGY

## 2019-03-25 PROCEDURE — 99999 PR PBB SHADOW E&M-EST. PATIENT-LVL IV: CPT | Mod: PBBFAC,,, | Performed by: UROLOGY

## 2019-03-25 RX ORDER — SILDENAFIL 100 MG/1
TABLET, FILM COATED ORAL
Refills: 5 | COMMUNITY
Start: 2019-01-30 | End: 2019-03-25 | Stop reason: ALTCHOICE

## 2019-03-25 RX ORDER — GABAPENTIN 300 MG/1
CAPSULE ORAL
Refills: 5 | COMMUNITY
Start: 2019-03-13 | End: 2020-01-01 | Stop reason: SDUPTHER

## 2019-03-25 NOTE — H&P (VIEW-ONLY)
[facility] Note  Staff: Yris Arreaga MD  PCP: Kamilah Jackson   Cardiologist:  Dr. Cobb ([facility].)    Chief Complaint: hematuria    Subjective:        HPI: Douglas Valle is a 63 y.o. male     Penile trauma/gross hematuria   Hematuria twice, once in sept and then again in oc, on xarelto and pletal.  Saw stanislaw on 11/6/18 who ordered a ctu (no obvious caus) and referred him to me. Cytology x 2 negative. 45 pack year h/o smoking. On further investigation he stated he has had mostly Penile bleeding not associated with hematuria.  No uti sx.  No burning. No h/o std.      I saw him on 11/8/18 and found copious amounts of old blood in his depends. Exam of his penis showed blood dripping from his penis. He had no penile edema. No scrotal edema.  I then placed a 20fr coude which went easily and urine was essentially clear. On further questioning he remembered trauma/ something hard hit his penis/groin the day prior to going to ER when all this started. I removed the catheter and then performed a cytoscopy and I could see that he had some bleeding in the pendulous urethra, distal to the bulb. On exam he stated that this is where he had trauma earlier. His bladder neck had some minor bleeding and his bladder had one small erythemaous spot on his left bladder wall. I then sent him STAT to radiology after speaking to radiology and had them perform a RUG to confirm no extravasation as that would be an emergent procedure. The RUG was negative. Both  talked and reviewed his imaging. I then had him return to clinic with a 22fr coude from the OR and I placed this easily.    He Underwent repeat cysto and bladder fulguration on 12/27/19. On the posterior bladder wall towards dome he had inflammation still.  I decided to fulgurate instead of bx bc he was on xarelto. Plan was to return in 3 months for repeat cysto to ensure no urethral stricture from the urethral truma and ensure no  lesions on posterior bladder wall.     Returns today and still no blood in urine. No difficulty urinating. .     Ua void:   neg  Urine history:  12/21/18 Ng, void: n/a  11/13/18 Ng, void: tr leuk/3+bld  11/8/18 Ng, void: tr leuk/tr prot/250 blood  11/6/18 Multiple organisms, void: red/250 blood, cytology: negative  11/5/18 No cx, void: tr prot/3+blood, cytology: negative  9/20/18 Ng, void: 2+blood  5/16/15 Ng    Erectile dysfunction  He has ok duration with sildenafil 20mg that  wrote for him. He was scared to try a higher dose. He has htn, DLD, DM.   Does have NG but hasn't taken it in years. Counseled on risks.     psa history  1st cousin with prostate cancer.     12/21/18 3.3, 11/8/18 ANSELMO: 45g (had cysto 11/8/18)      REVIEW OF SYSTEMS:  A comprehensive 10 system review was performed and is negative except as noted above in HPI    PMHx:  Past Medical History:   Diagnosis Date    Anemia     Anticoagulant long-term use     Atrial fibrillation     CAD (coronary artery disease)     DM2 (diabetes mellitus, type 2)     Elevated homocysteine     Encounter for blood transfusion     Gout     HLD (hyperlipidemia)     HTN (hypertension)     Hypothyroid     Myocardial infarction     Wears glasses      Kidney stones: No  Cataracts? None    PSHx:  Past Surgical History:   Procedure Laterality Date    CARDIAC SURGERY      CABG X 5  4-2000    CHOLECYSTECTOMY-LAPAROSCOPIC N/A 5/11/2015    [facility]    CORONARY ARTERY BYPASS GRAFT      CYSTOSCOPY N/A 12/27/2018    [facility]    ERCP N/A 5/15/2015    [facility] Pr[facility]iraldo [facility] ENDO    ERCP N/A 2/16/2015    [facility]lefino [facility]    stent          TONSILLECTOMY       Cardiology: stents x 1, last one placed in 2005, cabg in 2001. afib on xarelto. PVD and on pletal.  Cardiologist:Dr.George Cobb - sees every 3 months  Nephrologist:  [place]    Colonoscopy: FOBT negative last year, never had a colonscopy-mother had colon cancer    Fam Hx:   malignancies: No. Gyn cancer: mother with ovarian cancer diagnosed at end of life. Mother with colon cancer.  Mother  a 80. Father  a 84  kidney stones: No     Soc Hx:  Quit smoking 2000. 45 pack year hx of smoking  No alcohol  Lives in [place]  , here with wife Poly  2 children  Owns a box company    Allergies:  Patient has no known allergies.    Medications: sildenafil 20mg   Anticoagulation: Yes - Pletal 100 mg one tablet daily, Xarelto 20 mg one tablet daily for afib    Objective:     Vitals:    19 1014   BP: (!) 100/58   Pulse: 61     General:WDWN in NAD  Eyes: PERRLA, normal conjunctiva  Respiratory: no increased work on breathing, clear to auscultation  Cardiovascular: regular rate and rhythm. No obvious extremity edema.  GI: palpation of masses. No tenderness. No hepatosplenomegaly to palpation.  Musculoskeletal: normal range of motion of bilateral upper extremities. Normal muscle strength and tone.  Skin: no obvious rashes or lesions. No tightening of skin noted.  Neurologic: CN grossly normal. Normal sensation.   Psychiatric: awake, alert and oriented x 3. Mood and affect normal. Cooperative.     exam 18:   Inspection of anus normal  No scrotal rashes, cysts or lesions  Epididymis normal in size, no tenderness  Testes normal and size, equal size bilaterally, no masses  Urethral meatus normal without discharge  Penis is circumcised . Large clots   ANSELMO: 45g gland without masses, tenderness. SV not palpable. Normal sphincter tone. No hemhorroids.  No bilateral inguinal hernias noted     Bladder irrigation:  120cc normal saline placed through swanson, pt felt full and swanson removed. He voided clear yellow urine after. Some bleeding from the meatus initially but voided again.       LABS REVIEW:  BMP  Lab Results   Component Value Date     2018    K 4.2  11/05/2018     11/05/2018    CO2 22 (L) 11/05/2018    BUN 34 (H) 11/05/2018    CREATININE 2.2 (H) 11/05/2018    CALCIUM 9.8 11/05/2018    ANIONGAP 9 11/05/2018    ESTGFRAFRICA 35 (A) 11/05/2018    EGFRNONAA 31 (A) 11/05/2018     Lab Results   Component Value Date    WBC 7.90 11/05/2018    HGB 14.0 11/05/2018    HCT 41.3 11/05/2018    MCV 91 11/05/2018     11/05/2018     Lab Results   Component Value Date    PSA 3.3 12/21/2018         Pathology:  Cytology 11/6/18 and 11/5/18: negative for cancer cells    Radiology  Rug 11/8/18  No extravasation of urine    ctu 11/7/18 images reviewed  1. No obstructive uropathy, stones or other acute  abnormality.  2. Left renal simple cyst.  3. Prostatomegaly.  Correlate with PSA.  4. Coronary artery disease.    Assessment:       1. Gross hematuria    2. Penile trauma, sequela    3. Erectile dysfunction, unspecified erectile dysfunction type    4. Elevated PSA          Plan:     Gross hematuria secondary to his penile trauma, bladder lesion  · Cytology negative x 2. I think blood in urine was from trauma. signfnicant hx of smoker so higher risk for bladder cancer   · previous cysto 12/27/19 showed lesion on posterior bladder wall, likely from trauma, fulgurated only bc on xarelto. Plan was for repeat cysto to re-eval bladder lesion on posterior bladder wall and ensure he is no urethral stricture.  Scheduled now for April 15th, no urine sample needed 1 week prior.   · Cysto scheduled for Monday April 15. No urine sample needed 1 week prior.     Erectile dysfunction  · -already has sildenafil 20mg from . Explained he can take up to 5 pills. Has improvement with just 1 so can try 2, no more than 1 dosing in 24 hours. Cannot take with nitroglycerin   · Given ED sheet and instructions today   · -has ED bc of h/o DM, high blood pressure     psa history  · psa 3.3 12/21/19 but this was 1 month after procedure so will repeat in 3 months. F/u in 3 months with psa    · Has lots of questions about cousin with prostate cancer which we will address his concerns if psa is elevated in 3 months  · Of note he is on xarelto for afib    The natural history of prostate cancer and ongoing controversy regarding screening and potential treatment outcomes of prostate cancer has been discussed with the patient. The meaning of a false positive PSA and a false negative PSA has been discussed. He indicates understanding of the limitations of this screening test and wishes  to proceed with screening PSA testing.    [facility]: active    Yris Arreaga,

## 2019-03-25 NOTE — PROGRESS NOTES
[facility] Note  Staff: Yris Arreaga MD  PCP: Kamilah Jackson   Cardiologist:  Dr. Cobb ([facility].)    Chief Complaint: hematuria    Subjective:        HPI: Douglas Valle is a 63 y.o. male     Penile trauma/gross hematuria   Hematuria twice, once in sept and then again in oc, on xarelto and pletal.  Saw stanislaw on 11/6/18 who ordered a ctu (no obvious caus) and referred him to me. Cytology x 2 negative. 45 pack year h/o smoking. On further investigation he stated he has had mostly Penile bleeding not associated with hematuria.  No uti sx.  No burning. No h/o std.      I saw him on 11/8/18 and found copious amounts of old blood in his depends. Exam of his penis showed blood dripping from his penis. He had no penile edema. No scrotal edema.  I then placed a 20fr coude which went easily and urine was essentially clear. On further questioning he remembered trauma/ something hard hit his penis/groin the day prior to going to ER when all this started. I removed the catheter and then performed a cytoscopy and I could see that he had some bleeding in the pendulous urethra, distal to the bulb. On exam he stated that this is where he had trauma earlier. His bladder neck had some minor bleeding and his bladder had one small erythemaous spot on his left bladder wall. I then sent him STAT to radiology after speaking to radiology and had them perform a RUG to confirm no extravasation as that would be an emergent procedure. The RUG was negative. Both  talked and reviewed his imaging. I then had him return to clinic with a 22fr coude from the OR and I placed this easily.    He Underwent repeat cysto and bladder fulguration on 12/27/19. On the posterior bladder wall towards dome he had inflammation still.  I decided to fulgurate instead of bx bc he was on xarelto. Plan was to return in 3 months for repeat cysto to ensure no urethral stricture from the urethral truma and ensure no  lesions on posterior bladder wall.     Returns today and still no blood in urine. No difficulty urinating. .     Ua void:   neg  Urine history:  12/21/18 Ng, void: n/a  11/13/18 Ng, void: tr leuk/3+bld  11/8/18 Ng, void: tr leuk/tr prot/250 blood  11/6/18 Multiple organisms, void: red/250 blood, cytology: negative  11/5/18 No cx, void: tr prot/3+blood, cytology: negative  9/20/18 Ng, void: 2+blood  5/16/15 Ng    Erectile dysfunction  He has ok duration with sildenafil 20mg that  wrote for him. He was scared to try a higher dose. He has htn, DLD, DM.   Does have NG but hasn't taken it in years. Counseled on risks.     psa history  1st cousin with prostate cancer.     12/21/18 3.3, 11/8/18 ANSELMO: 45g (had cysto 11/8/18)      REVIEW OF SYSTEMS:  A comprehensive 10 system review was performed and is negative except as noted above in HPI    PMHx:  Past Medical History:   Diagnosis Date    Anemia     Anticoagulant long-term use     Atrial fibrillation     CAD (coronary artery disease)     DM2 (diabetes mellitus, type 2)     Elevated homocysteine     Encounter for blood transfusion     Gout     HLD (hyperlipidemia)     HTN (hypertension)     Hypothyroid     Myocardial infarction     Wears glasses      Kidney stones: No  Cataracts? None    PSHx:  Past Surgical History:   Procedure Laterality Date    CARDIAC SURGERY      CABG X 5  4-2000    CHOLECYSTECTOMY-LAPAROSCOPIC N/A 5/11/2015    [facility]    CORONARY ARTERY BYPASS GRAFT      CYSTOSCOPY N/A 12/27/2018    [facility]    ERCP N/A 5/15/2015    [facility] ENDO    ERCP N/A 2/16/2015    [facility]    stent          TONSILLECTOMY       Cardiology: stents x 1, last one placed in 2005, cabg in 2001. afib on xarelto. PVD and on pletal.  Cardiologist:Dr.George Cobb - sees every 3 months  Nephrologist:  [place]    Colonoscopy: FOBT negative last year, never had a colonscopy-mother had colon cancer    Fam Hx:   malignancies: No. Gyn cancer: mother with ovarian cancer diagnosed at end of life. Mother with colon cancer.  Mother  a 80. Father  a 84  kidney stones: No     Soc Hx:  Quit smoking 2000. 45 pack year hx of smoking  No alcohol  Lives in [place]  , here with wife Poly  2 children  Owns a box company    Allergies:  Patient has no known allergies.    Medications: sildenafil 20mg   Anticoagulation: Yes - Pletal 100 mg one tablet daily, Xarelto 20 mg one tablet daily for afib    Objective:     Vitals:    19 1014   BP: (!) 100/58   Pulse: 61     General:WDWN in NAD  Eyes: PERRLA, normal conjunctiva  Respiratory: no increased work on breathing, clear to auscultation  Cardiovascular: regular rate and rhythm. No obvious extremity edema.  GI: palpation of masses. No tenderness. No hepatosplenomegaly to palpation.  Musculoskeletal: normal range of motion of bilateral upper extremities. Normal muscle strength and tone.  Skin: no obvious rashes or lesions. No tightening of skin noted.  Neurologic: CN grossly normal. Normal sensation.   Psychiatric: awake, alert and oriented x 3. Mood and affect normal. Cooperative.     exam 18:   Inspection of anus normal  No scrotal rashes, cysts or lesions  Epididymis normal in size, no tenderness  Testes normal and size, equal size bilaterally, no masses  Urethral meatus normal without discharge  Penis is circumcised . Large clots   ANSELMO: 45g gland without masses, tenderness. SV not palpable. Normal sphincter tone. No hemhorroids.  No bilateral inguinal hernias noted     Bladder irrigation:  120cc normal saline placed through swanson, pt felt full and swanson removed. He voided clear yellow urine after. Some bleeding from the meatus initially but voided again.       LABS REVIEW:  BMP  Lab Results   Component Value Date     2018    K 4.2  11/05/2018     11/05/2018    CO2 22 (L) 11/05/2018    BUN 34 (H) 11/05/2018    CREATININE 2.2 (H) 11/05/2018    CALCIUM 9.8 11/05/2018    ANIONGAP 9 11/05/2018    ESTGFRAFRICA 35 (A) 11/05/2018    EGFRNONAA 31 (A) 11/05/2018     Lab Results   Component Value Date    WBC 7.90 11/05/2018    HGB 14.0 11/05/2018    HCT 41.3 11/05/2018    MCV 91 11/05/2018     11/05/2018     Lab Results   Component Value Date    PSA 3.3 12/21/2018         Pathology:  Cytology 11/6/18 and 11/5/18: negative for cancer cells    Radiology  Rug 11/8/18  No extravasation of urine    ctu 11/7/18 images reviewed  1. No obstructive uropathy, stones or other acute  abnormality.  2. Left renal simple cyst.  3. Prostatomegaly.  Correlate with PSA.  4. Coronary artery disease.    Assessment:       1. Gross hematuria    2. Penile trauma, sequela    3. Erectile dysfunction, unspecified erectile dysfunction type    4. Elevated PSA          Plan:     Gross hematuria secondary to his penile trauma, bladder lesion  · Cytology negative x 2. I think blood in urine was from trauma. signfnicant hx of smoker so higher risk for bladder cancer   · previous cysto 12/27/19 showed lesion on posterior bladder wall, likely from trauma, fulgurated only bc on xarelto. Plan was for repeat cysto to re-eval bladder lesion on posterior bladder wall and ensure he is no urethral stricture.  Scheduled now for April 15th, no urine sample needed 1 week prior.   · Cysto scheduled for Monday April 15. No urine sample needed 1 week prior.     Erectile dysfunction  · -already has sildenafil 20mg from . Explained he can take up to 5 pills. Has improvement with just 1 so can try 2, no more than 1 dosing in 24 hours. Cannot take with nitroglycerin   · Given ED sheet and instructions today   · -has ED bc of h/o DM, high blood pressure     psa history  · psa 3.3 12/21/19 but this was 1 month after procedure so will repeat in 3 months. F/u in 3 months with psa    · Has lots of questions about cousin with prostate cancer which we will address his concerns if psa is elevated in 3 months  · Of note he is on xarelto for afib    The natural history of prostate cancer and ongoing controversy regarding screening and potential treatment outcomes of prostate cancer has been discussed with the patient. The meaning of a false positive PSA and a false negative PSA has been discussed. He indicates understanding of the limitations of this screening test and wishes  to proceed with screening PSA testing.    [facility]: active    Yris Arreaga,

## 2019-03-25 NOTE — PATIENT INSTRUCTIONS
Cystoscopy on April 15, no urine needed 1 week beforehand  F/u in 3 months with psa (blood tests)  beforehand.       Gross hematuria secondary to his penile trauma, bladder lesion  · Cytology negative x 2. I think blood in urine was from trauma. signfnicant hx of smoker so higher risk for bladder cancer   · previous cysto 12/27/19 showed lesion on posterior bladder wall, likely from trauma, fulgurated only bc on xarelto. Plan was for repeat cysto to re-eval bladder lesion on posterior bladder wall and ensure he is no urethral stricture.  Scheduled now for April 15th, no urine sample needed 1 week prior.   · Cysto scheduled for Monday April 15. No urine sample needed 1 week prior.     Erectile dysfunction  · -already has sildenafil 20mg from . Explained he can take up to 5 pills. Has improvement with just 1 so can try 2, no more than 1 dosing in 24 hours.  · -has ED bc of h/o DM, high blood pressure     psa history  · psa 3.3 12/21/19 but this was 1 month after procedure so will repeat in 3 months and f/u after   · Has lots of questions about cousin with prostate cancer which we will address his concerns if psa is elevated in 3 months  · Of note he is on xarelto for afib    The natural history of prostate cancer and ongoing controversy regarding screening and potential treatment outcomes of prostate cancer has been discussed with the patient. The meaning of a false positive PSA and a false negative PSA has been discussed. He indicates understanding of the limitations of this screening test and wishes  to proceed with screening PSA testing.

## 2019-04-15 ENCOUNTER — HOSPITAL ENCOUNTER (OUTPATIENT)
Facility: AMBULARY SURGERY CENTER | Age: 64
Discharge: HOME | End: 2019-04-15
Attending: UROLOGY | Admitting: UROLOGY
Payer: MEDICARE

## 2019-04-15 DIAGNOSIS — N40.0 BENIGN PROSTATIC HYPERPLASIA, UNSPECIFIED WHETHER LOWER URINARY TRACT SYMPTOMS PRESENT: Primary | ICD-10-CM

## 2019-04-15 DIAGNOSIS — R31.0 GROSS HEMATURIA: ICD-10-CM

## 2019-04-15 DIAGNOSIS — S39.94XS: ICD-10-CM

## 2019-04-15 LAB
BACTERIA SPEC CULT: NORMAL
BILIRUB SERPL-MCNC: NORMAL MG/DL
BLOOD URINE, POC: NORMAL
CASTS: NORMAL
COLOR, POC UA: NORMAL
CRYSTALS: NORMAL
GLUCOSE UR QL STRIP: NORMAL
KETONES UR QL STRIP: NORMAL
LEUKOCYTE ESTERASE URINE, POC: NORMAL
NITRITE, POC UA: NORMAL
PH, POC UA: 5
PROTEIN, POC: NORMAL
RBC CELLS COUNTED: NORMAL
SPECIFIC GRAVITY, POC UA: 1.02
UROBILINOGEN, POC UA: NORMAL
WHITE BLOOD CELLS: NORMAL

## 2019-04-15 PROCEDURE — 52000 CYSTOURETHROSCOPY: CPT | Mod: ,,, | Performed by: UROLOGY

## 2019-04-15 PROCEDURE — 52000 PR CYSTOURETHROSCOPY: ICD-10-PCS | Mod: ,,, | Performed by: UROLOGY

## 2019-04-15 PROCEDURE — 52000 CYSTOURETHROSCOPY: CPT | Performed by: UROLOGY

## 2019-04-15 RX ORDER — LIDOCAINE HYDROCHLORIDE 20 MG/ML
JELLY TOPICAL
Status: DISCONTINUED | OUTPATIENT
Start: 2019-04-15 | End: 2019-04-15 | Stop reason: HOSPADM

## 2019-04-15 RX ORDER — CIPROFLOXACIN 500 MG/1
500 TABLET ORAL ONCE
Status: COMPLETED | OUTPATIENT
Start: 2019-04-15 | End: 2019-04-15

## 2019-04-15 RX ORDER — TAMSULOSIN HYDROCHLORIDE 0.4 MG/1
0.4 CAPSULE ORAL
Qty: 90 CAPSULE | Refills: 3 | Status: SHIPPED | OUTPATIENT
Start: 2019-04-15 | End: 2020-01-01 | Stop reason: SDUPTHER

## 2019-04-15 RX ORDER — LIDOCAINE HYDROCHLORIDE 20 MG/ML
JELLY TOPICAL
Status: DISPENSED
Start: 2019-04-15 | End: 2019-04-16

## 2019-04-15 RX ORDER — WATER 1000 ML/1000ML
INJECTION, SOLUTION INTRAVENOUS
Status: DISCONTINUED | OUTPATIENT
Start: 2019-04-15 | End: 2019-04-15 | Stop reason: HOSPADM

## 2019-04-15 RX ADMIN — CIPROFLOXACIN 500 MG: 500 TABLET ORAL at 03:04

## 2019-04-15 NOTE — DISCHARGE SUMMARY
[facility]  Urology  Discharge Note - Short Stay      Patient Name: Douglas Valle  MRN: [id number]  Discharge Date and Time:  04/15/2019 3:29 PM  Attending Physician: Yris Arreaga,*   Discharging Provider: Yris Arreaga MD  Primary Care Physician: Kamilah Jackson MD    Final Active Diagnoses:    Diagnosis Date Noted POA    Gross hematuria [R31.0] 04/15/2019 Yes      Problems Resolved During this Admission:       Final Diagnoses: Same as principal problem.    Hospital Course: Patient was admitted for an outpatient procedure and tolerated the procedure well with no complications.*    Procedure(s) (LRB):  CYSTOSCOPY (N/A)     Indwelling Lines/Drains at time of discharge:   Lines/Drains/Airways          None          Discharged Condition: good    Disposition: home    Follow Up:      Patient Instructions:   No discharge procedures on file.    Medications:  Reconciled Home Medications:      Medication List      START taking these medications    tamsulosin 0.4 mg Cap  Commonly known as:  FLOMAX  Take 1 capsule (0.4 mg total) by mouth after dinner.        CONTINUE taking these medications    allopurinol 300 MG tablet  Commonly known as:  ZYLOPRIM  Take 300 mg by mouth once daily.     amiodarone 200 MG Tab  Commonly known as:  PACERONE  Take 200 mg by mouth every Mon, Wed, Fri.     cilostazol 100 MG Tab  Commonly known as:  PLETAL  Take 100 mg by mouth once daily.     fenofibrate micronized 134 MG Cap  Commonly known as:  LOFIBRA  Take 134 mg by mouth daily with breakfast.     folic acid 1 MG tablet  Commonly known as:  FOLVITE  Take 1 mg by mouth once daily.     gabapentin 300 MG capsule  Commonly known as:  NEURONTIN     lisinopril 10 MG tablet  Take 10 mg by mouth once daily.     metFORMIN 500 MG tablet  Commonly known as:  GLUCOPHAGE  Take 500 mg by mouth daily with breakfast.     metoprolol succinate 50 MG 24 hr tablet  Commonly known as:  TOPROL-XL  Take 50 mg by mouth once  daily.     pantoprazole 40 MG tablet  Commonly known as:  PROTONIX  Take 40 mg by mouth once daily.     rivaroxaban 20 mg Tab  Commonly known as:  XARELTO  Take 1 tablet (20 mg total) by mouth once daily.            Discharge Procedure Orders (must include Diet, Follow-up, Activity):  No discharge procedures on file.         Yris Arreaga MD  Urology  [facility]

## 2019-04-15 NOTE — INTERVAL H&P NOTE
The patient has been examined and the H&P has been reviewed:    I concur with the findings and no changes have occurred since H&P was written.    Anesthesia/Surgery risks, benefits and alternative options discussed and understood by patient/family.    This patient has been cleared for surgery in ambulatory surgical facility.           Active Hospital Problems    Diagnosis  POA    Gross hematuria [R31.0]  Yes      Resolved Hospital Problems   No resolved problems to display.

## 2019-04-15 NOTE — PLAN OF CARE
Tolerated procedure well, water & juice po coral well, no desire to void.  discussesd instructions.

## 2019-04-15 NOTE — DISCHARGE INSTRUCTIONS
flomax (take at night, do not take with amolodipine)  F/u in 3 months with psa (BLOOD TEST) beforehand     Take Flomax/Tamsulosin 0.4mg NIGHTLY . Side effects include Lightheadedness when standing- be careful when going from sitting to standing because this medicine can cause a drop in blood pressure, stand slowly and usually better to take medicine at night and retrograde ejaculation (you may notice decreased or no ejaculate with orgasm). Why? To help relax prostate so that you will urinate with an improved flow.          Cystoscopy    Cystoscopy is a procedure that lets your doctor look directly inside your urethra and bladder. It can be used to:  · Help diagnose a problem with your urethra, bladder, or kidneys.  · Take a sample (biopsy) of bladder or urethral tissue.  · Treat certain problems (such as removing kidney stones).  · Place a stent to bypass an obstruction.  · Take special X-rays of the kidneys.  Based on the findings, your doctor may recommend other tests or treatments.  What is a cystoscope?  A cystoscope is a telescope-like instrument that contains lenses and fiberoptics (small glass wires that make bright light). The cystoscope may be straight and rigid, or flexible to bend around curves in the urethra. The doctor may look directly into the cystoscope, or project the image onto a monitor.  Getting ready  · Ask your doctor if you should stop taking any medicines before the procedure.  · Ask whether you should avoid eating or drinking anything after midnight before the procedure.  · Follow any other instructions your doctor gives you.  Tell your doctor before the exam if you:  · Take any medicines, such as aspirin or blood thinners  · Have allergies to any medicines  · Are pregnant   The procedure  Cystoscopy is done in the doctors office, surgery center, or hospital. The doctor and a nurse are present during the procedure. It takes only a few minutes, longer if a biopsy, X-ray, or  treatment needs to be done.  During the procedure:  · You lie on an exam table on your back, knees bent and legs apart. You are covered with a drape.  · Your urethra and the area around it are washed. Anesthetic jelly may be applied to numb the urethra. Other pain medicine is usually not needed. In some cases, you may be offered a mild sedative to help you relax. If a more extensive procedure is to be done, such as a biopsy or kidney stone removal, general anesthesia may be needed.  · The cystoscope is inserted. A sterile fluid is put into the bladder to expand it. You may feel pressure from this fluid.  · When the procedure is done, the cystoscope is removed.  After the procedure  If you had a sedative, general anesthesia, or spinal anesthesia, you must have someone drive you home. Once youre home:  · Drink plenty of fluids.  · You may have burning or light bleeding when you urinate--this is normal.  · Medicines may be prescribed to ease any discomfort or prevent infection. Take these as directed.  · Call your doctor if you have heavy bleeding or blood clots, burning that lasts more than a day, a fever over 100°F  (38° C), or trouble urinating.  Date Last Reviewed: 1/1/2017  © 2537-7410 [employer]. [address] [place] [address], [place] [address]. All rights reserved. This information is not intended as a substitute for professional medical care. Always follow your healthcare professional's instructions.

## 2019-04-15 NOTE — OP NOTE
[facility] Procedure Note- [facility]  Date: 04/15/2019    Procedures: Flexible cystourethroscopy     Pre Procedure Diagnosis:gross hematuria, penile trauma    Post Procedure Diagnosis: same, see below for findings    Surgeon: Yris Arreaga MD    Indications: Douglas Valle is a 63 y.o. male with above pre-procedure diagnosis. Urine from today reviewed. H&P reviewed.     Specimen: none    Anesthesia: 2% uro-jet lidocaine jelly for local analgesia    Procedure in detail:   Flexible cysto-urethroscopy was performed after consent was obtained.  The risks and benefits were explained.    2% lidocaine urojet was used for local analgesia.  The genitalia was prepped and draped in the sterile fashion with betadine.    The flexible scope was advanced into the urethra and into the bladder.  Bilateral ureteral orifice were evaluated and noted to be normal with clear efflux.  The bladder was completely surveyed in a systematic fashion and the cytoscope was retroflexed.  Cystoscopy findings as listed below.     The patient tolerated the procedure well without complication.    Findings: (pictures were  uploaded into media)  He had no evidence of urethral stricture as a result of his penile trauma. His bladder was large with grade 1 trabeculations and he had a large prostate with b obstructing lobes, circumferential intravesical median lobe with a subcervical portion extending into bladder. He had no bladder tumor or lesion.     Assesment: Douglas Valle is a 63 y.o. male with h/o gross hematuria from penile trauma. Confirmed no stricture and no tumors in bladder. Although he has no sx of bph, he has a large prostate and grade 1 trabeculations. I would like to go ahead and start him on flomax 0.4mg nightly and see if he has a better flow. He also previously had a psa 3.3 after his penile trauma/cysto.    Plan:  Return in 3 months with a psa beforehand and ensure it's lower. Explained procedures, enlarged prostate  (which he has)  and prostate cancer can make it higher so need to recheck after he's had no procedures.   Start flomax 0.4mg nightly. Take amlodipine in the morning.    Cipro in recovery    Yris Arreaga MD

## 2019-04-16 VITALS
HEART RATE: 54 BPM | WEIGHT: 260 LBS | OXYGEN SATURATION: 98 % | RESPIRATION RATE: 20 BRPM | HEIGHT: 72 IN | DIASTOLIC BLOOD PRESSURE: 87 MMHG | TEMPERATURE: 97 F | BODY MASS INDEX: 35.21 KG/M2 | SYSTOLIC BLOOD PRESSURE: 128 MMHG

## 2019-05-03 ENCOUNTER — OFFICE VISIT (OUTPATIENT)
Dept: ORTHOPEDICS | Facility: CLINIC | Age: 64
End: 2019-05-03
Payer: MEDICARE

## 2019-05-03 VITALS
HEIGHT: 72 IN | BODY MASS INDEX: 35.08 KG/M2 | WEIGHT: 259 LBS | HEART RATE: 68 BPM | SYSTOLIC BLOOD PRESSURE: 100 MMHG | DIASTOLIC BLOOD PRESSURE: 70 MMHG

## 2019-05-03 DIAGNOSIS — M54.16 LUMBAR RADICULITIS: ICD-10-CM

## 2019-05-03 DIAGNOSIS — M54.50 LUMBAR PAIN: Primary | ICD-10-CM

## 2019-05-03 DIAGNOSIS — M51.36 DISC DEGENERATION, LUMBAR: ICD-10-CM

## 2019-05-03 DIAGNOSIS — M47.816 LUMBAR FACET ARTHROPATHY: ICD-10-CM

## 2019-05-03 PROCEDURE — 3074F SYST BP LT 130 MM HG: CPT | Mod: ,,, | Performed by: PHYSICIAN ASSISTANT

## 2019-05-03 PROCEDURE — 99213 PR OFFICE/OUTPT VISIT, EST, LEVL III, 20-29 MIN: ICD-10-PCS | Mod: ,,, | Performed by: PHYSICIAN ASSISTANT

## 2019-05-03 PROCEDURE — 3008F BODY MASS INDEX DOCD: CPT | Mod: ,,, | Performed by: PHYSICIAN ASSISTANT

## 2019-05-03 PROCEDURE — 99213 OFFICE O/P EST LOW 20 MIN: CPT | Mod: ,,, | Performed by: PHYSICIAN ASSISTANT

## 2019-05-03 PROCEDURE — 3078F DIAST BP <80 MM HG: CPT | Mod: ,,, | Performed by: PHYSICIAN ASSISTANT

## 2019-05-03 PROCEDURE — 3078F PR MOST RECENT DIASTOLIC BLOOD PRESSURE < 80 MM HG: ICD-10-PCS | Mod: ,,, | Performed by: PHYSICIAN ASSISTANT

## 2019-05-03 PROCEDURE — 3074F PR MOST RECENT SYSTOLIC BLOOD PRESSURE < 130 MM HG: ICD-10-PCS | Mod: ,,, | Performed by: PHYSICIAN ASSISTANT

## 2019-05-03 PROCEDURE — 3008F PR BODY MASS INDEX (BMI) DOCUMENTED: ICD-10-PCS | Mod: ,,, | Performed by: PHYSICIAN ASSISTANT

## 2019-05-03 RX ORDER — LEVOTHYROXINE SODIUM 100 UG/1
100 TABLET ORAL
Refills: 3 | Status: ON HOLD | COMMUNITY
Start: 2019-04-05 | End: 2021-01-01 | Stop reason: CLARIF

## 2019-05-03 RX ORDER — TRAMADOL HYDROCHLORIDE 50 MG/1
50 TABLET ORAL EVERY 4 HOURS PRN
Qty: 42 TABLET | Refills: 2 | Status: SHIPPED | OUTPATIENT
Start: 2019-05-03 | End: 2019-05-10

## 2019-05-03 RX ORDER — CELECOXIB 200 MG/1
200 CAPSULE ORAL 2 TIMES DAILY
Qty: 60 CAPSULE | Refills: 1 | Status: SHIPPED | OUTPATIENT
Start: 2019-05-03 | End: 2019-07-22 | Stop reason: SDUPTHER

## 2019-05-03 RX ORDER — SILDENAFIL CITRATE 20 MG/1
TABLET ORAL
Refills: 3 | COMMUNITY
Start: 2019-04-08 | End: 2020-01-01 | Stop reason: ALTCHOICE

## 2019-05-03 RX ORDER — IBUPROFEN 200 MG
200 TABLET ORAL EVERY 6 HOURS PRN
COMMUNITY
End: 2020-01-01

## 2019-05-03 NOTE — PROGRESS NOTES
Subjective:       Patient ID: Douglas Valle is a 63 y.o. male.    Chief Complaint: Pain of the Lumbar Spine (Lumbar stiffness x 2 months with pain down both legs with burning in the feet. Limited walking. No bowel or bladder problems. No recent treatment)      History of Present Illness  Patient is here for reassessment. Chief complaint of low back pain and stiffness with bilateral lower extremity radiculitis and dysesthesia. He was last seen in our office over a year ago on 04/06/2018. At that time he was doing well status post some transforaminal epidural steroid injections.    Since the onset of his current symptoms about 3 months ago he has been seen by his primary care provider and has been referred for physical therapy which has not worked. He is also been taking NSAIDs which help but start to cause some significant gastroenteritis.    Previous lumbar MRI demonstrates L4-5 and L5-S1 disc disease. His current symptomology is different in quality and character but also similar in some ways..    Current Medications  Current Outpatient Medications   Medication Sig Dispense Refill    allopurinol (ZYLOPRIM) 300 MG tablet Take 300 mg by mouth once daily.      amiodarone (PACERONE) 200 MG Tab Take 200 mg by mouth every Mon, Wed, Fri.       cilostazol (PLETAL) 100 MG Tab Take 100 mg by mouth once daily.       fenofibrate micronized (LOFIBRA) 134 MG Cap Take 134 mg by mouth daily with breakfast.   5    folic acid (FOLVITE) 1 MG tablet Take 1 mg by mouth once daily.      gabapentin (NEURONTIN) 300 MG capsule   5    ibuprofen (ADVIL,MOTRIN) 200 MG tablet Take 200 mg by mouth every 6 (six) hours as needed for Pain.      lisinopril 10 MG tablet Take 10 mg by mouth once daily.      metformin (GLUCOPHAGE) 500 MG tablet Take 500 mg by mouth daily with breakfast.       metoprolol succinate (TOPROL-XL) 50 MG 24 hr tablet Take 50 mg by mouth once daily.      pantoprazole (PROTONIX) 40 MG tablet Take 40 mg by mouth  once daily.   4    rivaroxaban (XARELTO) 20 mg Tab Take 1 tablet (20 mg total) by mouth once daily. 30 tablet 3    SYNTHROID 100 mcg tablet   3    tamsulosin (FLOMAX) 0.4 mg Cap Take 1 capsule (0.4 mg total) by mouth after dinner. 90 capsule 3    sildenafil (REVATIO) 20 mg Tab   3     No current facility-administered medications for this visit.        Allergies  Review of patient's allergies indicates:  No Known Allergies    Past Medical History  Past Medical History:   Diagnosis Date    Anemia     Anticoagulant long-term use     Atrial fibrillation     CAD (coronary artery disease)     DM2 (diabetes mellitus, type 2)     Elevated homocysteine     Encounter for blood transfusion     Gout     HLD (hyperlipidemia)     HTN (hypertension)     Hypothyroid     Myocardial infarction     Wears glasses        Surgical History  Past Surgical History:   Procedure Laterality Date    CARDIAC SURGERY      CABG X 5  4-2000    CHOLECYSTECTOMY-LAPAROSCOPIC N/A 5/11/2015    [facility]    CORONARY ARTERY BYPASS GRAFT      CYSTOSCOPY N/A 4/15/2019    [facility]    CYSTOSCOPY N/A 12/27/2018    [facility]    ERCP N/A 5/15/2015    [facility]    ERCP N/A 2/16/2015    [facility]    stent          TONSILLECTOMY         Family History:   Family History   Problem Relation Age of Onset    Diabetes Mother     Hypertension Mother     Heart disease Father        Social History:   Social History     Socioeconomic History    Marital status:      Spouse name: Not on file    Number of children: Not on file    Years of education: Not on file    Highest education level: Not on file   Occupational History    Not on file   Social Needs    Financial resource strain: Not on file    Food insecurity:     Worry: Not on file      Inability: Not on file    Transportation needs:     Medical: Not on file     Non-medical: Not on file   Tobacco Use    Smoking status: Former Smoker     Packs/day: 3.00     Years: 30.00     Pack years: 90.00     Last attempt to quit: 2000     Years since quittin.0    Smokeless tobacco: Never Used   Substance and Sexual Activity    Alcohol use: Yes     Comment: OCC    Drug use: No    Sexual activity: Yes     Partners: Female   Lifestyle    Physical activity:     Days per week: Not on file     Minutes per session: Not on file    Stress: Not on file   Relationships    Social connections:     Talks on phone: Not on file     Gets together: Not on file     Attends [employer] service: Not on file     Active member of club or organization: Not on file     Attends meetings of clubs or organizations: Not on file     Relationship status: Not on file   Other Topics Concern    Not on file   Social History Narrative    Not on file       Hospitalization/Major Diagnostic Procedure:     Review of Systems     General/Constitutional:  Chills denies. Fatigue denies. Fever denies. Weight gain denies. Weight loss denies.    Respiratory:  Shortness of breath denies.    Cardiovascular:  Chest pain denies.    Gastrointestinal:  Constipation denies. Diarrhea denies. Nausea denies. Vomiting denies.     Hematology:  Easy bruising denies. Prolonged bleeding denies.     Genitourinary:  Frequent urination denies. Pain in lower back denies. Painful urination denies.     Musculoskeletal:  See HPI for details    Skin:  Rash denies.    Neurologic:  Dizziness denies. Gait abnormalities denies. Seizures denies. Tingling/Numbess denies.    Psychiatric:  Anxiety denies. Depressed mood denies.     Objective:   Vital Signs:   Vitals:    19 1124   BP: 100/70   Pulse: 68        Physical Exam      General Examination:     Constitutional: The patient is alert and oriented to lace person and time. Mood is pleasant.     Head/Face: Normal  facial features normal eyebrows    Eyes: Normal extraocular motion bilaterally    Lungs: Respirations are equal and unlabored    Gait is coordinated.    Cardiovascular: There are no swelling or varicosities present.    Lymphatic: Negative for adenopathy    Skin: Normal    Neurological: Level of consciousness normal. Oriented to place person and time and situation    Psychiatric: Oriented to time place person and situation    Lumbar exam: Nonantalgic gait. Lumbar range of motion within normal limits with pain at and range both flexion and extension. Diffuse bilateral lumbar lumbosacral tenderness palpation. Bilateral lower extremities demonstrate full active range of motion, equal and symmetric DTRs, and normal strength with a negative straight leg raise maneuver.    XRAY Report/ Interpretation: Lumbar AP and lateral x-rays taken in the office today and reviewed with the patient demonstrate multilevel degenerative disc disease and facet arthropathy worse at L4-5 and L5-S1      Assessment:       1. Lumbar pain    2. Disc degeneration, lumbar    3. Lumbar facet arthropathy    4. Lumbar radiculitis        Plan:       Douglas was seen today for pain.    Diagnoses and all orders for this visit:    Lumbar pain  -     X-Ray Lumbar Spine Ap And Lateral    Disc degeneration, lumbar    Lumbar facet arthropathy    Lumbar radiculitis         No follow-ups on file.    Treatment options were discussed in detail. Secondary to a different nature and his quality and intensity of pain compared to previous we recommend an updated lumbar MRI. He has been through the conservative treatment options to include NSAIDs as well as physical therapy and they have failed to decrease his symptomology to any significant degree. Follow-up in 3 weeks to review the MRI discuss further treatment options.    This note was created using Dragon voice recognition software that occasionally misinterpreted phrases or words.

## 2019-06-21 ENCOUNTER — TELEPHONE (OUTPATIENT)
Dept: UROLOGY | Facility: CLINIC | Age: 64
End: 2019-06-21

## 2019-07-22 DIAGNOSIS — M54.50 LUMBAR PAIN: ICD-10-CM

## 2019-07-22 DIAGNOSIS — M51.36 DISC DEGENERATION, LUMBAR: ICD-10-CM

## 2019-07-22 DIAGNOSIS — M47.816 LUMBAR FACET ARTHROPATHY: ICD-10-CM

## 2019-07-22 DIAGNOSIS — M54.16 LUMBAR RADICULITIS: ICD-10-CM

## 2019-07-22 RX ORDER — CELECOXIB 200 MG/1
CAPSULE ORAL
Qty: 60 CAPSULE | Refills: 1 | Status: SHIPPED | OUTPATIENT
Start: 2019-07-22 | End: 2019-11-16 | Stop reason: SDUPTHER

## 2019-08-12 RX ORDER — TEMAZEPAM 30 MG/1
CAPSULE ORAL
Qty: 30 CAPSULE | Refills: 0 | Status: SHIPPED | OUTPATIENT
Start: 2019-08-12

## 2019-09-06 ENCOUNTER — OFFICE VISIT (OUTPATIENT)
Dept: ORTHOPEDICS | Facility: CLINIC | Age: 64
End: 2019-09-06
Payer: MEDICARE

## 2019-09-06 VITALS
HEART RATE: 50 BPM | HEIGHT: 72 IN | SYSTOLIC BLOOD PRESSURE: 102 MMHG | WEIGHT: 256 LBS | DIASTOLIC BLOOD PRESSURE: 74 MMHG | BODY MASS INDEX: 34.67 KG/M2

## 2019-09-06 DIAGNOSIS — M48.062 LUMBAR STENOSIS WITH NEUROGENIC CLAUDICATION: ICD-10-CM

## 2019-09-06 DIAGNOSIS — M47.816 LUMBAR FACET ARTHROPATHY: ICD-10-CM

## 2019-09-06 DIAGNOSIS — M54.50 LUMBAR PAIN: Primary | ICD-10-CM

## 2019-09-06 DIAGNOSIS — M51.36 DISC DEGENERATION, LUMBAR: ICD-10-CM

## 2019-09-06 DIAGNOSIS — M54.16 LUMBAR RADICULITIS: ICD-10-CM

## 2019-09-06 PROCEDURE — 3078F DIAST BP <80 MM HG: CPT | Mod: S$GLB,,, | Performed by: PHYSICIAN ASSISTANT

## 2019-09-06 PROCEDURE — 99213 OFFICE O/P EST LOW 20 MIN: CPT | Mod: S$GLB,,, | Performed by: PHYSICIAN ASSISTANT

## 2019-09-06 PROCEDURE — 3074F PR MOST RECENT SYSTOLIC BLOOD PRESSURE < 130 MM HG: ICD-10-PCS | Mod: S$GLB,,, | Performed by: PHYSICIAN ASSISTANT

## 2019-09-06 PROCEDURE — 1101F PT FALLS ASSESS-DOCD LE1/YR: CPT | Mod: S$GLB,,, | Performed by: PHYSICIAN ASSISTANT

## 2019-09-06 PROCEDURE — 3074F SYST BP LT 130 MM HG: CPT | Mod: S$GLB,,, | Performed by: PHYSICIAN ASSISTANT

## 2019-09-06 PROCEDURE — 3008F BODY MASS INDEX DOCD: CPT | Mod: S$GLB,,, | Performed by: PHYSICIAN ASSISTANT

## 2019-09-06 PROCEDURE — 99213 PR OFFICE/OUTPT VISIT, EST, LEVL III, 20-29 MIN: ICD-10-PCS | Mod: S$GLB,,, | Performed by: PHYSICIAN ASSISTANT

## 2019-09-06 PROCEDURE — 1101F PR PT FALLS ASSESS DOC 0-1 FALLS W/OUT INJ PAST YR: ICD-10-PCS | Mod: S$GLB,,, | Performed by: PHYSICIAN ASSISTANT

## 2019-09-06 PROCEDURE — 3078F PR MOST RECENT DIASTOLIC BLOOD PRESSURE < 80 MM HG: ICD-10-PCS | Mod: S$GLB,,, | Performed by: PHYSICIAN ASSISTANT

## 2019-09-06 PROCEDURE — 3008F PR BODY MASS INDEX (BMI) DOCUMENTED: ICD-10-PCS | Mod: S$GLB,,, | Performed by: PHYSICIAN ASSISTANT

## 2019-09-06 RX ORDER — SILDENAFIL 100 MG/1
TABLET, FILM COATED ORAL
Refills: 5 | COMMUNITY
Start: 2019-08-05 | End: 2020-01-01 | Stop reason: ALTCHOICE

## 2019-09-06 RX ORDER — TRAMADOL HYDROCHLORIDE 50 MG/1
50 TABLET ORAL EVERY 6 HOURS PRN
Qty: 28 TABLET | Refills: 3 | Status: SHIPPED | OUTPATIENT
Start: 2019-09-06 | End: 2019-09-13

## 2019-09-06 RX ORDER — ATORVASTATIN CALCIUM 20 MG/1
20 TABLET, FILM COATED ORAL DAILY
Refills: 3 | COMMUNITY
Start: 2019-08-05

## 2019-09-06 NOTE — PROGRESS NOTES
Subjective:       Patient ID: Douglas Valle is a 64 y.o. male.    Chief Complaint: Pain of the Lumbar Spine (MRI results)      History of Present Illness  Patient is here to follow-up for low back pain with bilateral lower extremity radiculitis dysesthesia down to the ankle. He is an updated lumbar MRI to review.    Current Medications  Current Outpatient Medications   Medication Sig Dispense Refill    allopurinol (ZYLOPRIM) 300 MG tablet Take 300 mg by mouth once daily.      amiodarone (PACERONE) 200 MG Tab Take 200 mg by mouth every Mon, Wed, Fri.       atorvastatin (LIPITOR) 20 MG tablet   3    celecoxib (CELEBREX) 200 MG capsule TAKE 1 CAPSULE BY MOUTH TWICE DAILY 60 capsule 1    cilostazol (PLETAL) 100 MG Tab Take 100 mg by mouth once daily.       fenofibrate micronized (LOFIBRA) 134 MG Cap Take 134 mg by mouth daily with breakfast.   5    folic acid (FOLVITE) 1 MG tablet Take 1 mg by mouth once daily.      gabapentin (NEURONTIN) 300 MG capsule   5    ibuprofen (ADVIL,MOTRIN) 200 MG tablet Take 200 mg by mouth every 6 (six) hours as needed for Pain.      lisinopril 10 MG tablet Take 10 mg by mouth once daily.      metformin (GLUCOPHAGE) 500 MG tablet Take 500 mg by mouth daily with breakfast.       metoprolol succinate (TOPROL-XL) 50 MG 24 hr tablet Take 50 mg by mouth once daily.      pantoprazole (PROTONIX) 40 MG tablet Take 40 mg by mouth once daily.   4    rivaroxaban (XARELTO) 20 mg Tab Take 1 tablet (20 mg total) by mouth once daily. 30 tablet 3    sildenafil (REVATIO) 20 mg Tab   3    sildenafil (VIAGRA) 100 MG tablet   5    SYNTHROID 100 mcg tablet   3    temazepam (RESTORIL) 30 mg capsule TAKE ONE CAPSULE BY MOUTH AT BEDTIME 30 capsule 0    tamsulosin (FLOMAX) 0.4 mg Cap Take 1 capsule (0.4 mg total) by mouth after dinner. 90 capsule 3     No current facility-administered medications for this visit.        Allergies  Review of patient's allergies indicates:  No Known  Allergies    Past Medical History  Past Medical History:   Diagnosis Date    Anemia     Anticoagulant long-term use     Atrial fibrillation     CAD (coronary artery disease)     DM2 (diabetes mellitus, type 2)     Elevated homocysteine     Encounter for blood transfusion     Gout     HLD (hyperlipidemia)     HTN (hypertension)     Hypothyroid     Myocardial infarction     Wears glasses        Surgical History  Past Surgical History:   Procedure Laterality Date    CARDIAC SURGERY      CABG X 5      CHOLECYSTECTOMY-LAPAROSCOPIC N/A 2015    [facility]    CORONARY ARTERY BYPASS GRAFT      CYSTOSCOPY N/A 4/15/2019    [facility]    CYSTOSCOPY N/A 2018    [facility]    ERCP N/A 5/15/2015    [facility]    ERCP N/A 2015    [facility]    stent          TONSILLECTOMY         Family History:   Family History   Problem Relation Age of Onset    Diabetes Mother     Hypertension Mother     Heart disease Father        Social History:   Social History     Socioeconomic History    Marital status:      Spouse name: Not on file    Number of children: Not on file    Years of education: Not on file    Highest education level: Not on file   Occupational History    Not on file   Social Needs    Financial resource strain: Not on file    Food insecurity:     Worry: Not on file     Inability: Not on file    Transportation needs:     Medical: Not on file     Non-medical: Not on file   Tobacco Use    Smoking status: Former Smoker     Packs/day: 3.00     Years: 30.00     Pack years: 90.00     Last attempt to quit: 2000     Years since quittin.4    Smokeless tobacco: Never Used   Substance and Sexual Activity    Alcohol use: Yes     Comment: OCC    Drug use: No    Sexual activity:  Yes     Partners: Female   Lifestyle    Physical activity:     Days per week: Not on file     Minutes per session: Not on file    Stress: Not on file   Relationships    Social connections:     Talks on phone: Not on file     Gets together: Not on file     Attends [employer] service: Not on file     Active member of club or organization: Not on file     Attends meetings of clubs or organizations: Not on file     Relationship status: Not on file   Other Topics Concern    Not on file   Social History Narrative    Not on file       Hospitalization/Major Diagnostic Procedure:     Review of Systems     General/Constitutional:  Chills denies. Fatigue denies. Fever denies. Weight gain denies. Weight loss denies.    Respiratory:  Shortness of breath denies.    Cardiovascular:  Chest pain denies.    Gastrointestinal:  Constipation denies. Diarrhea denies. Nausea denies. Vomiting denies.     Hematology:  Easy bruising denies. Prolonged bleeding denies.     Genitourinary:  Frequent urination denies. Pain in lower back denies. Painful urination denies.     Musculoskeletal:  See HPI for details    Skin:  Rash denies.    Neurologic:  Dizziness denies. Gait abnormalities denies. Seizures denies. Tingling/Numbess denies.    Psychiatric:  Anxiety denies. Depressed mood denies.     Objective:   Vital Signs:   Vitals:    09/06/19 1052   BP: 102/74   Pulse: (!) 50        Physical Exam      General Examination:     Constitutional: The patient is alert and oriented to lace person and time. Mood is pleasant.     Head/Face: Normal facial features normal eyebrows    Eyes: Normal extraocular motion bilaterally    Lungs: Respirations are equal and unlabored    Gait is coordinated.    Cardiovascular: There are no swelling or varicosities present.    Lymphatic: Negative for adenopathy    Skin: Normal    Neurological: Level of consciousness normal. Oriented to place person and time and situation    Psychiatric: Oriented to time place person  and situation    Lumbar exam: Nonantalgic gait. Lumbar range of motion within normal limits with pain at and range both flexion and extension. Diffuse bilateral lumbar lumbosacral tenderness palpation. Bilateral lower extremities demonstrate full active range of motion, equal and symmetric DTRs, and normal strength with a negative straight leg raise maneuver.    XRAY Report/ Interpretation: Lumbar MRI report reviewed the patient in the office today demonstrates significant degenerative disc disease causing central stenosis and foraminal stenosis most predominant at L4-5 and secondarily at L5-S1      Assessment:       1. Lumbar pain    2. Disc degeneration, lumbar    3. Lumbar facet arthropathy    4. Lumbar radiculitis    5. Lumbar stenosis with neurogenic claudication        Plan:       Douglas was seen today for pain.    Diagnoses and all orders for this visit:    Lumbar pain    Disc degeneration, lumbar    Lumbar facet arthropathy    Lumbar radiculitis    Lumbar stenosis with neurogenic claudication         No follow-ups on file.    We briefly discussed surgical options with the patient is not there yet. Therefore we recommend bilateral L4 and L5 nerve root transforaminal MAGO ×2.    This note was created using Dragon voice recognition software that occasionally misinterpreted phrases or words.

## 2019-09-23 RX ORDER — TRAMADOL HYDROCHLORIDE 50 MG/1
TABLET ORAL
Qty: 28 TABLET | Refills: 0 | Status: SHIPPED | OUTPATIENT
Start: 2019-09-23 | End: 2019-10-10 | Stop reason: SDUPTHER

## 2019-10-11 RX ORDER — TRAMADOL HYDROCHLORIDE 50 MG/1
TABLET ORAL
Qty: 28 TABLET | Refills: 0 | Status: SHIPPED | OUTPATIENT
Start: 2019-10-11 | End: 2019-10-16 | Stop reason: SDUPTHER

## 2019-10-16 RX ORDER — TRAMADOL HYDROCHLORIDE 50 MG/1
TABLET ORAL
Qty: 28 TABLET | Refills: 0 | Status: SHIPPED | OUTPATIENT
Start: 2019-10-16 | End: 2020-01-01

## 2019-11-16 DIAGNOSIS — M51.36 DISC DEGENERATION, LUMBAR: ICD-10-CM

## 2019-11-16 DIAGNOSIS — M54.16 LUMBAR RADICULITIS: ICD-10-CM

## 2019-11-16 DIAGNOSIS — M47.816 LUMBAR FACET ARTHROPATHY: ICD-10-CM

## 2019-11-16 DIAGNOSIS — M54.50 LUMBAR PAIN: ICD-10-CM

## 2019-11-17 RX ORDER — CELECOXIB 200 MG/1
CAPSULE ORAL
Qty: 60 CAPSULE | Refills: 1 | Status: SHIPPED | OUTPATIENT
Start: 2019-11-17 | End: 2020-01-01

## 2020-01-01 ENCOUNTER — LAB VISIT (OUTPATIENT)
Dept: LAB | Facility: HOSPITAL | Age: 65
End: 2020-01-01
Attending: UROLOGY
Payer: MEDICARE

## 2020-01-01 ENCOUNTER — TELEPHONE (OUTPATIENT)
Dept: UROLOGY | Facility: CLINIC | Age: 65
End: 2020-01-01

## 2020-01-01 ENCOUNTER — HOSPITAL ENCOUNTER (OUTPATIENT)
Dept: RADIOLOGY | Facility: HOSPITAL | Age: 65
Discharge: HOME | End: 2020-11-10
Attending: INTERNAL MEDICINE
Payer: MEDICARE

## 2020-01-01 ENCOUNTER — TELEPHONE (OUTPATIENT)
Dept: HEMATOLOGY/ONCOLOGY | Facility: CLINIC | Age: 65
End: 2020-01-01

## 2020-01-01 ENCOUNTER — PATIENT MESSAGE (OUTPATIENT)
Dept: UROLOGY | Facility: CLINIC | Age: 65
End: 2020-01-01

## 2020-01-01 ENCOUNTER — OFFICE VISIT (OUTPATIENT)
Dept: HEMATOLOGY/ONCOLOGY | Facility: CLINIC | Age: 65
End: 2020-01-01
Payer: MEDICARE

## 2020-01-01 ENCOUNTER — HOSPITAL ENCOUNTER (OUTPATIENT)
Dept: PREADMISSION TESTING | Facility: HOSPITAL | Age: 65
Discharge: HOME | End: 2020-05-25
Attending: SPECIALIST
Payer: MEDICARE

## 2020-01-01 ENCOUNTER — HOSPITAL ENCOUNTER (EMERGENCY)
Facility: HOSPITAL | Age: 65
Discharge: HOME | End: 2020-10-16
Attending: EMERGENCY MEDICINE
Payer: MEDICARE

## 2020-01-01 ENCOUNTER — DOCUMENTATION ONLY (OUTPATIENT)
Dept: HEMATOLOGY/ONCOLOGY | Facility: CLINIC | Age: 65
End: 2020-01-01

## 2020-01-01 ENCOUNTER — OFFICE VISIT (OUTPATIENT)
Dept: UROLOGY | Facility: CLINIC | Age: 65
End: 2020-01-01
Payer: MEDICARE

## 2020-01-01 ENCOUNTER — CLINICAL SUPPORT (OUTPATIENT)
Dept: UROLOGY | Facility: CLINIC | Age: 65
End: 2020-01-01
Payer: MEDICARE

## 2020-01-01 ENCOUNTER — HOSPITAL ENCOUNTER (OUTPATIENT)
Dept: RADIOLOGY | Facility: HOSPITAL | Age: 65
Discharge: HOME | End: 2020-12-22
Attending: INTERNAL MEDICINE
Payer: MEDICARE

## 2020-01-01 ENCOUNTER — ANESTHESIA (OUTPATIENT)
Dept: CARDIOLOGY | Facility: HOSPITAL | Age: 65
End: 2020-01-01
Payer: MEDICARE

## 2020-01-01 ENCOUNTER — ANESTHESIA EVENT (OUTPATIENT)
Dept: CARDIOLOGY | Facility: HOSPITAL | Age: 65
End: 2020-01-01
Payer: MEDICARE

## 2020-01-01 ENCOUNTER — CLINICAL SUPPORT (OUTPATIENT)
Dept: CARDIOLOGY | Facility: HOSPITAL | Age: 65
End: 2020-01-01
Attending: SPECIALIST
Payer: MEDICARE

## 2020-01-01 ENCOUNTER — HOSPITAL ENCOUNTER (OUTPATIENT)
Facility: HOSPITAL | Age: 65
Discharge: HOME | End: 2020-05-26
Attending: SPECIALIST | Admitting: SPECIALIST
Payer: MEDICARE

## 2020-01-01 VITALS
SYSTOLIC BLOOD PRESSURE: 118 MMHG | DIASTOLIC BLOOD PRESSURE: 71 MMHG | HEART RATE: 76 BPM | WEIGHT: 264.44 LBS | HEIGHT: 73 IN | BODY MASS INDEX: 35.05 KG/M2

## 2020-01-01 VITALS
HEART RATE: 102 BPM | HEIGHT: 73 IN | DIASTOLIC BLOOD PRESSURE: 73 MMHG | BODY MASS INDEX: 31.91 KG/M2 | SYSTOLIC BLOOD PRESSURE: 122 MMHG | TEMPERATURE: 99 F | OXYGEN SATURATION: 96 % | WEIGHT: 240.75 LBS | RESPIRATION RATE: 18 BRPM

## 2020-01-01 VITALS
SYSTOLIC BLOOD PRESSURE: 131 MMHG | TEMPERATURE: 99 F | RESPIRATION RATE: 18 BRPM | HEART RATE: 75 BPM | OXYGEN SATURATION: 98 % | BODY MASS INDEX: 31.99 KG/M2 | WEIGHT: 242.5 LBS | DIASTOLIC BLOOD PRESSURE: 68 MMHG

## 2020-01-01 VITALS
HEIGHT: 73 IN | WEIGHT: 239.88 LBS | OXYGEN SATURATION: 98 % | BODY MASS INDEX: 31.79 KG/M2 | HEART RATE: 81 BPM | DIASTOLIC BLOOD PRESSURE: 59 MMHG | SYSTOLIC BLOOD PRESSURE: 102 MMHG | TEMPERATURE: 97 F | RESPIRATION RATE: 18 BRPM

## 2020-01-01 VITALS
RESPIRATION RATE: 14 BRPM | SYSTOLIC BLOOD PRESSURE: 110 MMHG | HEART RATE: 74 BPM | TEMPERATURE: 98 F | DIASTOLIC BLOOD PRESSURE: 58 MMHG | OXYGEN SATURATION: 98 %

## 2020-01-01 VITALS
SYSTOLIC BLOOD PRESSURE: 86 MMHG | BODY MASS INDEX: 33.46 KG/M2 | WEIGHT: 252.44 LBS | HEIGHT: 73 IN | DIASTOLIC BLOOD PRESSURE: 52 MMHG | HEART RATE: 77 BPM

## 2020-01-01 VITALS — WEIGHT: 254.63 LBS | HEIGHT: 73 IN | BODY MASS INDEX: 33.75 KG/M2

## 2020-01-01 VITALS — WEIGHT: 242 LBS | BODY MASS INDEX: 32.07 KG/M2 | HEIGHT: 73 IN

## 2020-01-01 DIAGNOSIS — N13.8 BPH WITH OBSTRUCTION/LOWER URINARY TRACT SYMPTOMS: ICD-10-CM

## 2020-01-01 DIAGNOSIS — R97.20 RISING PSA LEVEL: Primary | ICD-10-CM

## 2020-01-01 DIAGNOSIS — Z01.818 PREOP TESTING: Primary | ICD-10-CM

## 2020-01-01 DIAGNOSIS — N40.0 BENIGN PROSTATIC HYPERPLASIA, UNSPECIFIED WHETHER LOWER URINARY TRACT SYMPTOMS PRESENT: Primary | ICD-10-CM

## 2020-01-01 DIAGNOSIS — I48.91 A-FIB: ICD-10-CM

## 2020-01-01 DIAGNOSIS — R07.9 CHEST PAIN: ICD-10-CM

## 2020-01-01 DIAGNOSIS — N52.9 ERECTILE DYSFUNCTION, UNSPECIFIED ERECTILE DYSFUNCTION TYPE: ICD-10-CM

## 2020-01-01 DIAGNOSIS — I25.10 CORONARY ARTERY DISEASE INVOLVING NATIVE CORONARY ARTERY OF NATIVE HEART WITHOUT ANGINA PECTORIS: Primary | ICD-10-CM

## 2020-01-01 DIAGNOSIS — C34.32 MALIGNANT NEOPLASM OF LOWER LOBE, LEFT BRONCHUS OR LUNG: ICD-10-CM

## 2020-01-01 DIAGNOSIS — R59.0 MEDIASTINAL ADENOPATHY: ICD-10-CM

## 2020-01-01 DIAGNOSIS — R97.20 RISING PSA LEVEL: ICD-10-CM

## 2020-01-01 DIAGNOSIS — N40.0 BENIGN PROSTATIC HYPERPLASIA, UNSPECIFIED WHETHER LOWER URINARY TRACT SYMPTOMS PRESENT: ICD-10-CM

## 2020-01-01 DIAGNOSIS — M51.36 DISC DEGENERATION, LUMBAR: ICD-10-CM

## 2020-01-01 DIAGNOSIS — R91.8 MASS OF LEFT LUNG: ICD-10-CM

## 2020-01-01 DIAGNOSIS — Z12.5 ENCOUNTER FOR SCREENING FOR MALIGNANT NEOPLASM OF PROSTATE: ICD-10-CM

## 2020-01-01 DIAGNOSIS — M54.50 LUMBAR PAIN: ICD-10-CM

## 2020-01-01 DIAGNOSIS — R07.89 ATYPICAL CHEST PAIN: ICD-10-CM

## 2020-01-01 DIAGNOSIS — R91.8 MASS OF LOWER LOBE OF LEFT LUNG: Primary | ICD-10-CM

## 2020-01-01 DIAGNOSIS — I48.19 PERSISTENT ATRIAL FIBRILLATION: ICD-10-CM

## 2020-01-01 DIAGNOSIS — R91.8 MASS OF LEFT LUNG: Primary | ICD-10-CM

## 2020-01-01 DIAGNOSIS — S39.94XD: ICD-10-CM

## 2020-01-01 DIAGNOSIS — M47.816 LUMBAR FACET ARTHROPATHY: ICD-10-CM

## 2020-01-01 DIAGNOSIS — N40.1 BPH WITH OBSTRUCTION/LOWER URINARY TRACT SYMPTOMS: ICD-10-CM

## 2020-01-01 DIAGNOSIS — N40.1 ENLARGED PROSTATE WITH URINARY OBSTRUCTION: Primary | ICD-10-CM

## 2020-01-01 DIAGNOSIS — R59.0 HILAR ADENOPATHY: ICD-10-CM

## 2020-01-01 DIAGNOSIS — R59.0 MEDIASTINAL LYMPHADENOPATHY: ICD-10-CM

## 2020-01-01 DIAGNOSIS — N13.8 ENLARGED PROSTATE WITH URINARY OBSTRUCTION: Primary | ICD-10-CM

## 2020-01-01 DIAGNOSIS — M54.16 LUMBAR RADICULITIS: ICD-10-CM

## 2020-01-01 DIAGNOSIS — R10.13 EPIGASTRIC PAIN: ICD-10-CM

## 2020-01-01 DIAGNOSIS — K29.70 GASTRITIS, PRESENCE OF BLEEDING UNSPECIFIED, UNSPECIFIED CHRONICITY, UNSPECIFIED GASTRITIS TYPE: ICD-10-CM

## 2020-01-01 DIAGNOSIS — C34.92 PRIMARY LUNG SQUAMOUS CELL CARCINOMA, LEFT: Primary | ICD-10-CM

## 2020-01-01 DIAGNOSIS — I48.91 ATRIAL FIBRILLATION, UNSPECIFIED TYPE: ICD-10-CM

## 2020-01-01 LAB
ALBUMIN SERPL BCP-MCNC: 3.8 G/DL (ref 3.5–5.2)
ALBUMIN SERPL BCP-MCNC: 3.9 G/DL (ref 3.5–5.2)
ALP SERPL-CCNC: 508 U/L (ref 55–135)
ALP SERPL-CCNC: 54 U/L (ref 55–135)
ALT SERPL W/O P-5'-P-CCNC: 18 U/L (ref 10–44)
ALT SERPL W/O P-5'-P-CCNC: 193 U/L (ref 10–44)
ANION GAP SERPL CALC-SCNC: 5 MMOL/L (ref 8–16)
ANION GAP SERPL CALC-SCNC: 9 MMOL/L (ref 8–16)
ANION GAP SERPL CALC-SCNC: 9 MMOL/L (ref 8–16)
APTT PPP: 38.8 SEC (ref 23.6–33.3)
AST SERPL-CCNC: 101 U/L (ref 10–40)
AST SERPL-CCNC: 17 U/L (ref 10–40)
BASOPHILS # BLD AUTO: 0.03 K/UL (ref 0–0.2)
BASOPHILS # BLD AUTO: 0.04 K/UL (ref 0–0.2)
BASOPHILS NFR BLD: 0.5 % (ref 0–1.9)
BASOPHILS NFR BLD: 0.6 % (ref 0–1.9)
BILIRUB SERPL-MCNC: 0.7 MG/DL (ref 0.1–1)
BILIRUB SERPL-MCNC: 0.9 MG/DL (ref 0.1–1)
BILIRUB SERPL-MCNC: ABNORMAL MG/DL
BILIRUB SERPL-MCNC: ABNORMAL MG/DL
BLOOD URINE, POC: ABNORMAL
BLOOD URINE, POC: ABNORMAL
BUN SERPL-MCNC: 20 MG/DL (ref 8–23)
BUN SERPL-MCNC: 31 MG/DL (ref 8–23)
BUN SERPL-MCNC: 40 MG/DL (ref 8–23)
CALCIUM SERPL-MCNC: 8.9 MG/DL (ref 8.7–10.5)
CALCIUM SERPL-MCNC: 9.1 MG/DL (ref 8.7–10.5)
CALCIUM SERPL-MCNC: 9.3 MG/DL (ref 8.7–10.5)
CHLORIDE SERPL-SCNC: 105 MMOL/L (ref 95–110)
CHLORIDE SERPL-SCNC: 109 MMOL/L (ref 95–110)
CHLORIDE SERPL-SCNC: 109 MMOL/L (ref 95–110)
CLARITY, POC UA: CLEAR
CO2 SERPL-SCNC: 23 MMOL/L (ref 23–29)
CO2 SERPL-SCNC: 24 MMOL/L (ref 23–29)
CO2 SERPL-SCNC: 26 MMOL/L (ref 23–29)
COLOR, POC UA: ABNORMAL
COLOR, POC UA: YELLOW
COMPLEXED PSA SERPL-MCNC: 2.8 NG/ML (ref 0–4)
COMPLEXED PSA SERPL-MCNC: 3.6 NG/ML (ref 0–4)
CREAT SERPL-MCNC: 1.6 MG/DL (ref 0.5–1.4)
CREAT SERPL-MCNC: 1.7 MG/DL (ref 0.5–1.4)
CREAT SERPL-MCNC: 1.7 MG/DL (ref 0.5–1.4)
CREAT SERPL-MCNC: 2.1 MG/DL (ref 0.5–1.4)
DIFFERENTIAL METHOD: ABNORMAL
DIFFERENTIAL METHOD: ABNORMAL
EOSINOPHIL # BLD AUTO: 0.2 K/UL (ref 0–0.5)
EOSINOPHIL # BLD AUTO: 0.2 K/UL (ref 0–0.5)
EOSINOPHIL NFR BLD: 2.4 % (ref 0–8)
EOSINOPHIL NFR BLD: 3.7 % (ref 0–8)
ERYTHROCYTE [DISTWIDTH] IN BLOOD BY AUTOMATED COUNT: 16 % (ref 11.5–14.5)
ERYTHROCYTE [DISTWIDTH] IN BLOOD BY AUTOMATED COUNT: 16.3 % (ref 11.5–14.5)
EST. GFR  (AFRICAN AMERICAN): 37.1 ML/MIN/1.73 M^2
EST. GFR  (AFRICAN AMERICAN): 47.9 ML/MIN/1.73 M^2
EST. GFR  (AFRICAN AMERICAN): 48 ML/MIN/1.73 M^2
EST. GFR  (NON AFRICAN AMERICAN): 32.1 ML/MIN/1.73 M^2
EST. GFR  (NON AFRICAN AMERICAN): 41 ML/MIN/1.73 M^2
EST. GFR  (NON AFRICAN AMERICAN): 41.4 ML/MIN/1.73 M^2
GLUCOSE SERPL-MCNC: 104 MG/DL (ref 70–110)
GLUCOSE SERPL-MCNC: 107 MG/DL (ref 70–110)
GLUCOSE SERPL-MCNC: 89 MG/DL (ref 70–110)
GLUCOSE SERPL-MCNC: 94 MG/DL (ref 70–110)
GLUCOSE UR QL STRIP: ABNORMAL
GLUCOSE UR QL STRIP: ABNORMAL
HCT VFR BLD AUTO: 41.6 % (ref 40–54)
HCT VFR BLD AUTO: 46.2 % (ref 40–54)
HGB BLD-MCNC: 13.6 G/DL (ref 14–18)
HGB BLD-MCNC: 14.9 G/DL (ref 14–18)
IMM GRANULOCYTES # BLD AUTO: 0.03 K/UL (ref 0–0.04)
IMM GRANULOCYTES # BLD AUTO: 0.05 K/UL (ref 0–0.04)
IMM GRANULOCYTES NFR BLD AUTO: 0.5 % (ref 0–0.5)
IMM GRANULOCYTES NFR BLD AUTO: 0.7 % (ref 0–0.5)
INR PPP: 2.2
KETONES UR QL STRIP: ABNORMAL
KETONES UR QL STRIP: ABNORMAL
LEUKOCYTE ESTERASE URINE, POC: ABNORMAL
LEUKOCYTE ESTERASE URINE, POC: ABNORMAL
LIPASE SERPL-CCNC: 44 U/L (ref 4–60)
LYMPHOCYTES # BLD AUTO: 0.6 K/UL (ref 1–4.8)
LYMPHOCYTES # BLD AUTO: 0.8 K/UL (ref 1–4.8)
LYMPHOCYTES NFR BLD: 13.5 % (ref 18–48)
LYMPHOCYTES NFR BLD: 8.3 % (ref 18–48)
MCH RBC QN AUTO: 29.2 PG (ref 27–31)
MCH RBC QN AUTO: 30.5 PG (ref 27–31)
MCHC RBC AUTO-ENTMCNC: 32.3 G/DL (ref 32–36)
MCHC RBC AUTO-ENTMCNC: 32.7 G/DL (ref 32–36)
MCV RBC AUTO: 90 FL (ref 82–98)
MCV RBC AUTO: 95 FL (ref 82–98)
MONOCYTES # BLD AUTO: 0.5 K/UL (ref 0.3–1)
MONOCYTES # BLD AUTO: 0.6 K/UL (ref 0.3–1)
MONOCYTES NFR BLD: 10 % (ref 4–15)
MONOCYTES NFR BLD: 7.4 % (ref 4–15)
NEUTROPHILS # BLD AUTO: 4.3 K/UL (ref 1.8–7.7)
NEUTROPHILS # BLD AUTO: 5.7 K/UL (ref 1.8–7.7)
NEUTROPHILS NFR BLD: 71.8 % (ref 38–73)
NEUTROPHILS NFR BLD: 80.6 % (ref 38–73)
NITRITE, POC UA: ABNORMAL
NITRITE, POC UA: ABNORMAL
NRBC BLD-RTO: 0 /100 WBC
NRBC BLD-RTO: 0 /100 WBC
PH, POC UA: 5.5
PH, POC UA: 6
PLATELET # BLD AUTO: 136 K/UL (ref 150–350)
PLATELET # BLD AUTO: 162 K/UL (ref 150–350)
PMV BLD AUTO: 10.2 FL (ref 9.2–12.9)
PMV BLD AUTO: 9.8 FL (ref 9.2–12.9)
POTASSIUM SERPL-SCNC: 4.4 MMOL/L (ref 3.5–5.1)
POTASSIUM SERPL-SCNC: 4.9 MMOL/L (ref 3.5–5.1)
POTASSIUM SERPL-SCNC: 5.5 MMOL/L (ref 3.5–5.1)
PROSTATE SPECIFIC ANTIGEN, TOTAL: 3.2 NG/ML (ref 0–4)
PROT SERPL-MCNC: 6.4 G/DL (ref 6–8.4)
PROT SERPL-MCNC: 7.2 G/DL (ref 6–8.4)
PROTEIN, POC: 100
PROTEIN, POC: ABNORMAL
PROTHROMBIN TIME: 23.2 SEC (ref 10.6–14.8)
PSA FREE MFR SERPL: 33.13 %
PSA FREE SERPL-MCNC: 1.06 NG/ML (ref 0.01–1.5)
RBC # BLD AUTO: 4.65 M/UL (ref 4.6–6.2)
RBC # BLD AUTO: 4.88 M/UL (ref 4.6–6.2)
SAMPLE: ABNORMAL
SARS-COV-2 RNA RESP QL NAA+PROBE: NOT DETECTED
SODIUM SERPL-SCNC: 137 MMOL/L (ref 136–145)
SODIUM SERPL-SCNC: 138 MMOL/L (ref 136–145)
SODIUM SERPL-SCNC: 144 MMOL/L (ref 136–145)
SPECIFIC GRAVITY, POC UA: 1.02
SPECIFIC GRAVITY, POC UA: 1.03
TROPONIN I SERPL DL<=0.01 NG/ML-MCNC: 0.01 NG/ML (ref 0–0.03)
UROBILINOGEN, POC UA: 1
UROBILINOGEN, POC UA: 1
WBC # BLD AUTO: 5.99 K/UL (ref 3.9–12.7)
WBC # BLD AUTO: 7.07 K/UL (ref 3.9–12.7)

## 2020-01-01 PROCEDURE — 1126F PR PAIN SEVERITY QUANTIFIED, NO PAIN PRESENT: ICD-10-PCS | Mod: S$GLB,,, | Performed by: INTERNAL MEDICINE

## 2020-01-01 PROCEDURE — 99999 PR PBB SHADOW E&M-EST. PATIENT-LVL III: ICD-10-PCS | Mod: PBBFAC,,, | Performed by: UROLOGY

## 2020-01-01 PROCEDURE — 80053 COMPREHEN METABOLIC PANEL: CPT

## 2020-01-01 PROCEDURE — 25000003 PHARM REV CODE 250: Performed by: EMERGENCY MEDICINE

## 2020-01-01 PROCEDURE — U0003 INFECTIOUS AGENT DETECTION BY NUCLEIC ACID (DNA OR RNA); SEVERE ACUTE RESPIRATORY SYNDROME CORONAVIRUS 2 (SARS-COV-2) (CORONAVIRUS DISEASE [COVID-19]), AMPLIFIED PROBE TECHNIQUE, MAKING USE OF HIGH THROUGHPUT TECHNOLOGIES AS DESCRIBED BY CMS-2020-01-R: HCPCS

## 2020-01-01 PROCEDURE — 1101F PR PT FALLS ASSESS DOC 0-1 FALLS W/OUT INJ PAST YR: ICD-10-PCS | Mod: CPTII,S$GLB,, | Performed by: INTERNAL MEDICINE

## 2020-01-01 PROCEDURE — 3074F PR MOST RECENT SYSTOLIC BLOOD PRESSURE < 130 MM HG: ICD-10-PCS | Mod: CPTII,S$GLB,, | Performed by: INTERNAL MEDICINE

## 2020-01-01 PROCEDURE — 3074F PR MOST RECENT SYSTOLIC BLOOD PRESSURE < 130 MM HG: ICD-10-PCS | Mod: CPTII,S$GLB,, | Performed by: UROLOGY

## 2020-01-01 PROCEDURE — 84153 ASSAY OF PSA TOTAL: CPT

## 2020-01-01 PROCEDURE — 85730 THROMBOPLASTIN TIME PARTIAL: CPT

## 2020-01-01 PROCEDURE — 80048 BASIC METABOLIC PNL TOTAL CA: CPT

## 2020-01-01 PROCEDURE — 63600175 PHARM REV CODE 636 W HCPCS: Performed by: NURSE ANESTHETIST, CERTIFIED REGISTERED

## 2020-01-01 PROCEDURE — 3078F PR MOST RECENT DIASTOLIC BLOOD PRESSURE < 80 MM HG: ICD-10-PCS | Mod: CPTII,S$GLB,, | Performed by: UROLOGY

## 2020-01-01 PROCEDURE — 3008F BODY MASS INDEX DOCD: CPT | Mod: CPTII,S$GLB,, | Performed by: UROLOGY

## 2020-01-01 PROCEDURE — 93010 EKG 12-LEAD: ICD-10-PCS | Mod: ,,, | Performed by: INTERNAL MEDICINE

## 2020-01-01 PROCEDURE — 3078F DIAST BP <80 MM HG: CPT | Mod: CPTII,S$GLB,, | Performed by: INTERNAL MEDICINE

## 2020-01-01 PROCEDURE — 99285 EMERGENCY DEPT VISIT HI MDM: CPT | Mod: 25

## 2020-01-01 PROCEDURE — 3074F SYST BP LT 130 MM HG: CPT | Mod: CPTII,S$GLB,, | Performed by: INTERNAL MEDICINE

## 2020-01-01 PROCEDURE — 82565 ASSAY OF CREATININE: CPT | Mod: PO

## 2020-01-01 PROCEDURE — 99215 PR OFFICE/OUTPT VISIT, EST, LEVL V, 40-54 MIN: ICD-10-PCS | Mod: 25,S$GLB,, | Performed by: UROLOGY

## 2020-01-01 PROCEDURE — 84484 ASSAY OF TROPONIN QUANT: CPT

## 2020-01-01 PROCEDURE — 37000009 HC ANESTHESIA EA ADD 15 MINS: Performed by: SPECIALIST

## 2020-01-01 PROCEDURE — 81002 POCT URINE DIPSTICK WITHOUT MICROSCOPE: ICD-10-PCS | Mod: S$GLB,,, | Performed by: UROLOGY

## 2020-01-01 PROCEDURE — 36415 COLL VENOUS BLD VENIPUNCTURE: CPT

## 2020-01-01 PROCEDURE — 85610 PROTHROMBIN TIME: CPT

## 2020-01-01 PROCEDURE — 3074F SYST BP LT 130 MM HG: CPT | Mod: CPTII,S$GLB,, | Performed by: UROLOGY

## 2020-01-01 PROCEDURE — 84154 ASSAY OF PSA FREE: CPT

## 2020-01-01 PROCEDURE — 99205 PR OFFICE/OUTPT VISIT, NEW, LEVL V, 60-74 MIN: ICD-10-PCS | Mod: S$GLB,,, | Performed by: INTERNAL MEDICINE

## 2020-01-01 PROCEDURE — 99999 PR PBB SHADOW E&M-EST. PATIENT-LVL III: CPT | Mod: PBBFAC,,, | Performed by: UROLOGY

## 2020-01-01 PROCEDURE — 3078F PR MOST RECENT DIASTOLIC BLOOD PRESSURE < 80 MM HG: ICD-10-PCS | Mod: CPTII,S$GLB,, | Performed by: INTERNAL MEDICINE

## 2020-01-01 PROCEDURE — 99499 NO LOS: ICD-10-PCS | Mod: S$GLB,,, | Performed by: UROLOGY

## 2020-01-01 PROCEDURE — 93005 ELECTROCARDIOGRAM TRACING: CPT | Performed by: INTERNAL MEDICINE

## 2020-01-01 PROCEDURE — 3078F DIAST BP <80 MM HG: CPT | Mod: CPTII,S$GLB,, | Performed by: UROLOGY

## 2020-01-01 PROCEDURE — 78815 PET IMAGE W/CT SKULL-THIGH: CPT | Mod: TC,PO,PI

## 2020-01-01 PROCEDURE — 1101F PR PT FALLS ASSESS DOC 0-1 FALLS W/OUT INJ PAST YR: ICD-10-PCS | Mod: CPTII,S$GLB,, | Performed by: UROLOGY

## 2020-01-01 PROCEDURE — 1126F AMNT PAIN NOTED NONE PRSNT: CPT | Mod: S$GLB,,, | Performed by: INTERNAL MEDICINE

## 2020-01-01 PROCEDURE — 3008F BODY MASS INDEX DOCD: CPT | Mod: CPTII,S$GLB,, | Performed by: INTERNAL MEDICINE

## 2020-01-01 PROCEDURE — 99205 OFFICE O/P NEW HI 60 MIN: CPT | Mod: S$GLB,,, | Performed by: INTERNAL MEDICINE

## 2020-01-01 PROCEDURE — 1159F PR MEDICATION LIST DOCUMENTED IN MEDICAL RECORD: ICD-10-PCS | Mod: S$GLB,,, | Performed by: INTERNAL MEDICINE

## 2020-01-01 PROCEDURE — 99214 OFFICE O/P EST MOD 30 MIN: CPT | Mod: 25,S$GLB,, | Performed by: UROLOGY

## 2020-01-01 PROCEDURE — 99499 UNLISTED E&M SERVICE: CPT | Mod: S$GLB,,, | Performed by: UROLOGY

## 2020-01-01 PROCEDURE — 1101F PT FALLS ASSESS-DOCD LE1/YR: CPT | Mod: CPTII,S$GLB,, | Performed by: INTERNAL MEDICINE

## 2020-01-01 PROCEDURE — 51741 PR UROFLOWMETRY, COMPLEX: ICD-10-PCS | Mod: S$GLB,,, | Performed by: UROLOGY

## 2020-01-01 PROCEDURE — 99999 PR PBB SHADOW E&M-EST. PATIENT-LVL V: CPT | Mod: PBBFAC,,, | Performed by: INTERNAL MEDICINE

## 2020-01-01 PROCEDURE — 3288F PR FALLS RISK ASSESSMENT DOCUMENTED: ICD-10-PCS | Mod: CPTII,S$GLB,, | Performed by: INTERNAL MEDICINE

## 2020-01-01 PROCEDURE — 81002 URINALYSIS NONAUTO W/O SCOPE: CPT | Mod: S$GLB,,, | Performed by: UROLOGY

## 2020-01-01 PROCEDURE — 83690 ASSAY OF LIPASE: CPT

## 2020-01-01 PROCEDURE — 99215 PR OFFICE/OUTPT VISIT, EST, LEVL V, 40-54 MIN: ICD-10-PCS | Mod: S$GLB,,, | Performed by: INTERNAL MEDICINE

## 2020-01-01 PROCEDURE — 99443 PR PHYSICIAN TELEPHONE EVALUATION 21-30 MIN: ICD-10-PCS | Mod: 95,,, | Performed by: UROLOGY

## 2020-01-01 PROCEDURE — 1159F MED LIST DOCD IN RCRD: CPT | Mod: S$GLB,,, | Performed by: INTERNAL MEDICINE

## 2020-01-01 PROCEDURE — 82962 GLUCOSE BLOOD TEST: CPT | Mod: PO

## 2020-01-01 PROCEDURE — 99999 PR PBB SHADOW E&M-EST. PATIENT-LVL IV: CPT | Mod: PBBFAC,,, | Performed by: INTERNAL MEDICINE

## 2020-01-01 PROCEDURE — 3008F PR BODY MASS INDEX (BMI) DOCUMENTED: ICD-10-PCS | Mod: CPTII,S$GLB,, | Performed by: UROLOGY

## 2020-01-01 PROCEDURE — 1101F PT FALLS ASSESS-DOCD LE1/YR: CPT | Mod: CPTII,S$GLB,, | Performed by: UROLOGY

## 2020-01-01 PROCEDURE — 99215 OFFICE O/P EST HI 40 MIN: CPT | Mod: 25,S$GLB,, | Performed by: UROLOGY

## 2020-01-01 PROCEDURE — 99999 PR PBB SHADOW E&M-EST. PATIENT-LVL V: ICD-10-PCS | Mod: PBBFAC,,, | Performed by: INTERNAL MEDICINE

## 2020-01-01 PROCEDURE — 99999 PR PBB SHADOW E&M-EST. PATIENT-LVL IV: ICD-10-PCS | Mod: PBBFAC,,, | Performed by: INTERNAL MEDICINE

## 2020-01-01 PROCEDURE — 93010 ELECTROCARDIOGRAM REPORT: CPT | Mod: ,,, | Performed by: INTERNAL MEDICINE

## 2020-01-01 PROCEDURE — 3288F FALL RISK ASSESSMENT DOCD: CPT | Mod: CPTII,S$GLB,, | Performed by: INTERNAL MEDICINE

## 2020-01-01 PROCEDURE — 25000003 PHARM REV CODE 250: Performed by: NURSE ANESTHETIST, CERTIFIED REGISTERED

## 2020-01-01 PROCEDURE — 85025 COMPLETE CBC W/AUTO DIFF WBC: CPT

## 2020-01-01 PROCEDURE — 37000008 HC ANESTHESIA 1ST 15 MINUTES: Performed by: SPECIALIST

## 2020-01-01 PROCEDURE — 93321 DOPPLER ECHO F-UP/LMTD STD: CPT

## 2020-01-01 PROCEDURE — 99443 PR PHYSICIAN TELEPHONE EVALUATION 21-30 MIN: CPT | Mod: 95,,, | Performed by: UROLOGY

## 2020-01-01 PROCEDURE — 93005 ELECTROCARDIOGRAM TRACING: CPT

## 2020-01-01 PROCEDURE — 99215 OFFICE O/P EST HI 40 MIN: CPT | Mod: S$GLB,,, | Performed by: INTERNAL MEDICINE

## 2020-01-01 PROCEDURE — 99214 PR OFFICE/OUTPT VISIT, EST, LEVL IV, 30-39 MIN: ICD-10-PCS | Mod: 25,S$GLB,, | Performed by: UROLOGY

## 2020-01-01 PROCEDURE — 25500020 PHARM REV CODE 255: Mod: PO | Performed by: INTERNAL MEDICINE

## 2020-01-01 PROCEDURE — 3008F PR BODY MASS INDEX (BMI) DOCUMENTED: ICD-10-PCS | Mod: CPTII,S$GLB,, | Performed by: INTERNAL MEDICINE

## 2020-01-01 PROCEDURE — 36000 PLACE NEEDLE IN VEIN: CPT

## 2020-01-01 PROCEDURE — 51741 ELECTRO-UROFLOWMETRY FIRST: CPT | Mod: S$GLB,,, | Performed by: UROLOGY

## 2020-01-01 RX ORDER — LISINOPRIL 10 MG/1
5 TABLET ORAL DAILY
Qty: 30 TABLET | Refills: 2 | Status: SHIPPED | OUTPATIENT
Start: 2020-01-01 | End: 2020-01-01 | Stop reason: ALTCHOICE

## 2020-01-01 RX ORDER — ALLOPURINOL 300 MG/1
300 TABLET ORAL DAILY
COMMUNITY
Start: 2020-01-01

## 2020-01-01 RX ORDER — TADALAFIL 5 MG/1
5 TABLET ORAL DAILY
Qty: 30 TABLET | Refills: 11 | Status: SHIPPED | OUTPATIENT
Start: 2020-01-01 | End: 2021-12-10

## 2020-01-01 RX ORDER — AMIODARONE HYDROCHLORIDE 200 MG/1
200 TABLET ORAL 2 TIMES DAILY
Qty: 60 TABLET | Refills: 2 | Status: SHIPPED | OUTPATIENT
Start: 2020-01-01

## 2020-01-01 RX ORDER — PROPOFOL 10 MG/ML
VIAL (ML) INTRAVENOUS
Status: DISCONTINUED | OUTPATIENT
Start: 2020-01-01 | End: 2020-01-01

## 2020-01-01 RX ORDER — TAMSULOSIN HYDROCHLORIDE 0.4 MG/1
0.4 CAPSULE ORAL
Qty: 90 CAPSULE | Refills: 3 | Status: SHIPPED | OUTPATIENT
Start: 2020-01-01 | End: 2020-01-01 | Stop reason: SDUPTHER

## 2020-01-01 RX ORDER — GABAPENTIN 300 MG/1
300 CAPSULE ORAL 2 TIMES DAILY
Qty: 60 CAPSULE | Refills: 5 | Status: SHIPPED | OUTPATIENT
Start: 2020-01-01 | End: 2020-01-01

## 2020-01-01 RX ORDER — METOPROLOL SUCCINATE 50 MG/1
25 TABLET, EXTENDED RELEASE ORAL DAILY
Qty: 30 TABLET | Refills: 2 | Status: ON HOLD | OUTPATIENT
Start: 2020-01-01 | End: 2021-01-01 | Stop reason: CLARIF

## 2020-01-01 RX ORDER — CELECOXIB 200 MG/1
CAPSULE ORAL
Qty: 60 CAPSULE | Refills: 1 | Status: ON HOLD | OUTPATIENT
Start: 2020-01-01 | End: 2020-01-01 | Stop reason: HOSPADM

## 2020-01-01 RX ORDER — TADALAFIL 5 MG/1
5 TABLET ORAL DAILY
Qty: 30 TABLET | Refills: 11 | Status: SHIPPED | OUTPATIENT
Start: 2020-01-01 | End: 2020-01-01

## 2020-01-01 RX ORDER — GABAPENTIN 300 MG/1
CAPSULE ORAL
Qty: 90 CAPSULE | Refills: 5 | Status: SHIPPED | OUTPATIENT
Start: 2020-01-01 | End: 2020-01-01

## 2020-01-01 RX ORDER — AMIODARONE HYDROCHLORIDE 200 MG/1
TABLET ORAL
COMMUNITY
Start: 2020-01-01 | End: 2020-01-01 | Stop reason: SDUPTHER

## 2020-01-01 RX ORDER — ATORVASTATIN CALCIUM 20 MG
TABLET ORAL
COMMUNITY
Start: 2020-01-01 | End: 2020-01-01 | Stop reason: ALTCHOICE

## 2020-01-01 RX ORDER — SODIUM CHLORIDE 9 MG/ML
INJECTION, SOLUTION INTRAVENOUS CONTINUOUS PRN
Status: DISCONTINUED | OUTPATIENT
Start: 2020-01-01 | End: 2020-01-01

## 2020-01-01 RX ORDER — LEVOTHYROXINE SODIUM 25 UG/1
75 TABLET ORAL
Status: DISCONTINUED | OUTPATIENT
Start: 2020-01-01 | End: 2020-01-01 | Stop reason: HOSPADM

## 2020-01-01 RX ORDER — PANTOPRAZOLE SODIUM 20 MG/1
20 TABLET, DELAYED RELEASE ORAL DAILY
Qty: 30 TABLET | Refills: 0 | Status: SHIPPED | OUTPATIENT
Start: 2020-01-01 | End: 2020-01-01

## 2020-01-01 RX ORDER — TAMSULOSIN HYDROCHLORIDE 0.4 MG/1
0.4 CAPSULE ORAL
Qty: 90 CAPSULE | Refills: 3 | Status: SHIPPED | OUTPATIENT
Start: 2020-01-01 | End: 2021-12-11

## 2020-01-01 RX ADMIN — PROPOFOL 30 MG: 10 INJECTION, EMULSION INTRAVENOUS at 10:05

## 2020-01-01 RX ADMIN — PROPOFOL 20 MG: 10 INJECTION, EMULSION INTRAVENOUS at 10:05

## 2020-01-01 RX ADMIN — PROPOFOL 40 MG: 10 INJECTION, EMULSION INTRAVENOUS at 10:05

## 2020-01-01 RX ADMIN — PROPOFOL 60 MG: 10 INJECTION, EMULSION INTRAVENOUS at 10:05

## 2020-01-01 RX ADMIN — SODIUM CHLORIDE: 9 INJECTION, SOLUTION INTRAVENOUS at 10:05

## 2020-01-01 RX ADMIN — IOHEXOL 100 ML: 350 INJECTION, SOLUTION INTRAVENOUS at 04:11

## 2020-01-01 RX ADMIN — LIDOCAINE HYDROCHLORIDE: 20 SOLUTION ORAL; TOPICAL at 10:10

## 2020-01-27 NOTE — TELEPHONE ENCOUNTER
1/4/20 4.5H quest    Please let pt know his psa is higher  He defintiely needs to f/u on 2/10/20 to discuss

## 2020-01-28 NOTE — TELEPHONE ENCOUNTER
Spoke with patient informed him of results and recommendations. Patient verbally voiced understanding.

## 2020-02-10 PROBLEM — N40.1 BPH WITH OBSTRUCTION/LOWER URINARY TRACT SYMPTOMS: Status: ACTIVE | Noted: 2020-01-01

## 2020-02-10 PROBLEM — N13.8 BPH WITH OBSTRUCTION/LOWER URINARY TRACT SYMPTOMS: Status: ACTIVE | Noted: 2020-01-01

## 2020-02-10 PROBLEM — Z12.5 ENCOUNTER FOR SCREENING FOR MALIGNANT NEOPLASM OF PROSTATE: Status: ACTIVE | Noted: 2020-01-01

## 2020-02-10 PROBLEM — S39.94XD: Status: ACTIVE | Noted: 2020-01-01

## 2020-02-10 PROBLEM — N52.9 ERECTILE DYSFUNCTION: Status: ACTIVE | Noted: 2020-01-01

## 2020-02-10 PROBLEM — R31.0 GROSS HEMATURIA: Status: RESOLVED | Noted: 2019-04-15 | Resolved: 2020-01-01

## 2020-02-10 NOTE — PROGRESS NOTES
[facility] Note  Staff: Yris Arreaga MD  PCP: Kamilah Jackson   Cardiologist:  Dr. Cobb ([facility]    Chief Complaint: hematuria    Subjective:        HPI: Douglas Valle is a 64 y.o. male     Penile trauma/gross hematuria   Hematuria twice, once in sept and then again in oct, on xarelto and pletal.  Saw stanislaw on 11/6/18 who ordered a ctu (no obvious caus) and referred him to me. Cytology x 2 negative. 45 pack year h/o smoking. On further investigation he stated he has had mostly Penile bleeding not associated with hematuria.I saw him on 11/8/18 and found copious amounts of old blood in his depends. Exam of his penis showed blood dripping from his penis. He had no penile edema. No scrotal edema.  I then placed a 20fr coude which went easily and urine was essentially clear. RUG neg.  I removed the catheter and then performed a cytoscopy and I could see that he had some bleeding in the pendulous urethra, distal to the bulb and then placed catheter for healing.     He Underwent repeat cysto and bladder fulguration on 12/27/19. On the posterior bladder wall towards dome he had inflammation still.  I decided to fulgurate instead of bx bc he was on xarelto. Also had a strong family hx of prostate cancer. psa in 12/2018 was 3.3, was supposed to have repeat psa 3 months later.     Interval history:   Repeat cysto on 4/15/19 showed no evidence of urethral stricture as result of penile trauma, noted to have large prostate with bph and circ intravesical median lobe and started him on flomax. Then asked him to f/u in 3 months with psa beforehand but he cancelled that appt.    Then he had a psa done at [facility] on 1/4/20 and now it's 4.5.   He is however urinating better on flomax and denies any weak stream.   Also still has ED despite use of 100 viagra.    Ua void:   neg  Urine history:  12/21/18 Ng, void: n/a  11/13/18 Ng, void: tr leuk/3+bld  11/8/18 Ng, void: tr leuk/tr prot/250  blood  18 Multiple organisms, void: red/250 blood, cytology: negative  18 No cx, void: tr prot/3+blood, cytology: negative  18 Ng, void: 2+blood  5/16/15 Ng      psa history - 1st cousin with prostate cancer.   20  4.5 quest, ANSELMO 2/10/20: 25g no nodules  18 3.3, 18 ANSELMO: 45g (had cysto 18)      REVIEW OF SYSTEMS:  A comprehensive 10 system review was performed and is negative except as noted above in HPI    PMHx:  Past Medical History:   Diagnosis Date    Anemia     Anticoagulant long-term use     Atrial fibrillation     CAD (coronary artery disease)     DM2 (diabetes mellitus, type 2)     Elevated homocysteine     Encounter for blood transfusion     Gout     HLD (hyperlipidemia)     HTN (hypertension)     Hypothyroid     Myocardial infarction     Wears glasses      Kidney stones: No  Cataracts? None    PSHx:  Past Surgical History:   Procedure Laterality Date    CARDIAC SURGERY      CABG X 5  4-    CORONARY ARTERY BYPASS GRAFT      CYSTOSCOPY N/A 2018    Procedure: CYSTOSCOPY;  Surgeon: Yris Arreaga MD;  Location: [facility] OR;  Service: Urology;  Laterality: N/A;    CYSTOSCOPY N/A 4/15/2019    Procedure: CYSTOSCOPY;  Surgeon: Yris Arreaga MD;  Location: [facility] OR;  Service: Urology;  Laterality: N/A;    stent          TONSILLECTOMY       Cardiology: stents x 1, last one placed in , cabg in . afib on xarelto. PVD and on pletal.  Cardiologist:Dr.George Cobb - sees every 3 months  Nephrologist: karla    Colonoscopy: FOBT negative  never had a colonscopy-aunt  colon cancer    Fam Hx:   malignancies: No. Gyn cancer: mother with ovarian cancer diagnosed at end of life. Mother with colon cancer.  Mother  a 80. Father  a 84  kidney stones: No     Soc Hx:  Quit smoking 2000. 45 pack year hx of smoking  No alcohol  Lives in [place]  , here with wife Poly  2 children  Owns a box  company    Allergies:  Patient has no known allergies.    Medications: sildenafil 100mg, flomax 0.4mg nightly   Anticoagulation: Yes - Pletal 100 mg one tablet daily, Xarelto 20 mg one tablet daily for afib    Objective:     Vitals:    02/10/20 1127   BP: 118/71   Pulse: 76     General:WDWN in NAD  Eyes: PERRLA, normal conjunctiva  Respiratory: no increased work on breathing, clear to auscultation  Cardiovascular: regular rate and rhythm. No obvious extremity edema.  GI: palpation of masses. No tenderness. No hepatosplenomegaly to palpation.  Musculoskeletal: normal range of motion of bilateral upper extremities. Normal muscle strength and tone.  Skin: no obvious rashes or lesions. No tightening of skin noted.  Neurologic: CN grossly normal. Normal sensation.   Psychiatric: awake, alert and oriented x 3. Mood and affect normal. Cooperative.     exam 11/6/18:   Inspection of anus normal  No scrotal rashes, cysts or lesions  Epididymis normal in size, no tenderness  Testes normal and size, equal size bilaterally, no masses  Urethral meatus normal without discharge  Penis is circumcised . Large clots   AR: 45g gland without masses, tenderness. SV not palpable. Normal sphincter tone. No hemhorroids.  No bilateral inguinal hernias noted     Ar: as above      LABS REVIEW:  Recent Labs   Lab 08/29/17  0936 11/05/18 2038   WBC 7.70 7.90   Hemoglobin 15.8 14.0   Hematocrit 46.7 41.3   Platelets 165 183   ]  Recent Labs   Lab 08/29/17  0936 11/05/18 2038   Sodium 137 139   Potassium 4.8 4.2   Chloride 104 108   CO2 26 22 L   BUN, Bld 28 H 34 H   Creatinine 2.1 H 2.2 H   Glucose 108 104   Calcium 9.8 9.8   Alkaline Phosphatase 83 41 L   Total Protein 7.2 7.0   Albumin 3.6 4.1   Total Bilirubin 0.6 0.5   AST 20 16   ALT 29 11   ]    Lab Results   Component Value Date    HGBA1C 6.3 (H) 02/15/2015         Pathology:  Cytology 11/6/18 and 11/5/18: negative for cancer cells    Radiology  Rug 11/8/18  No extravasation of  urine    ctu 11/7/18 images reviewed  1. No obstructive uropathy, stones or other acute  abnormality.  2. Left renal simple cyst.  3. Prostatomegaly.  Correlate with PSA.  4. Coronary artery disease.    Assessment:       1. Rising PSA level    2. BPH with obstruction/lower urinary tract symptoms    3. Erectile dysfunction, unspecified erectile dysfunction type          Plan:       · ANSELMO today showed small prostate with no nodules, however psa almost 1.2 higher and concerning for prostate cancer. Would like to repeat psa screening and psa free and total. If psa remains recommend cystoscopy and biopsy. On xarelto (Dr.George Cobb writes him for xarelto, saw him few weeks ago).   · Return for uroflow and pvr (risk of stricture from penile trauma)   · Discussed importance of f/u with ME WITH PSA'S, especially with rising psa  · Continue flomax 0.4mg nightly, doing well on this. Refilled for 1 year through [facility]. 90d supply   · Discontinue viagra, start tadalafil 5mg once daily (like cialis). Do not double dose. Take everyday. Written at [facility] for 30d supply. Try at least for 1 month. May help urinate better. Needs to call [facility]. $35/month       Is on xarelto for afib (cardiologist- Dr.George oCbb)    Recommend he sign up for mychart [facility]:      Yris Arreaga,

## 2020-02-10 NOTE — PATIENT INSTRUCTIONS
· ANSELMO today showed small prostate with no nodules, however psa almost 1.2 higher and concerning for prostate cancer. Would like to repeat psa screening and psa free and total. If psa remains recommend cystoscopy and biopsy. On xarelto (Dr.George Cobb writes him for xarelto, saw him few weeks ago).   · Return for uroflow and pvr (risk of stricture from penile trauma)   · Discussed importance of f/u with ME WITH PSA'S, especially with rising psa  · Continue flomax 0.4mg nightly, doing well on this. Refilled for 1 year through [facility]. 90d supply   · Discontinue viagra, start tadalafil 5mg once daily (like cialis). Do not double dose. Take everyday. Written at [facility] for 30d supply. Try at least for 1 month. May help urinate better. Needs to call [facility]. $35/month     Erectile Dysfunction Medication Prescription (if brand name too expensive)     sent tadalafil 20mg to [facility], a compounding pharmacy in [place]  (Tadalafil is the compounded form of Cialis)  You must Call [phone] with credit card and they should ship for free    You were written for:   Tadalafil 20mg, 10 tablets $23.00 with refills for the year  Tadalafil 5mg, 30 tablets $29.50 with refills for the year    [facility]- compounding pharmacy  [facility]  [address] [place] [address], [address]  [place] [id number]    Instructions for the 5mg dose  Take one 5mg pill daily  Do not double the dose  Do not use as needed, needs to be taken daily      ________________________________________    Medications available from [facility]  Tadalafil 5mg, 30 tablets $29.50  Tadalafil 20mg, 10 tablets $23.00  Sidlenafil 25mg/50mg/100mg, 6 tablets for $16.27, 10 tablets for $19.53, 35 tablets for 35.80

## 2020-02-18 NOTE — PROGRESS NOTES
Per  patient arrived in clinic today for uroflow and pvr.    Uroflow results (date: 02/18/2020) on  :   Voiding time: 23.4s,   Flow time: 22.1s,   TTP flow: 4.1s,   Peak flowrate: 15.8 mL/s,   Average flowrate: 7.1mL/s,   Intervals: 3,    Voided volume: 157 mL,    Pvr by bladder scan: 24.   Pattern of curve: to be determined by physician.

## 2020-02-19 NOTE — PROGRESS NOTES
Uroflow results (date: 02/18/2020) on  :   Voiding time: 23.4s,   Flow time: 22.1s,   TTP flow: 4.1s,   Peak flowrate: 15.8 mL/s,   Average flowrate: 7.1mL/s,   Intervals: 3,    Voided volume: 157 mL,    Pvr by bladder scan: 24.   Pattern of curve: fst rise, mostly normal empty      Uroflow results reviewed and interpreted by me ()  Fairly normal but slow avg flow

## 2020-02-19 NOTE — TELEPHONE ENCOUNTER
----- Message from Maykel Chaudhary sent at 2/19/2020 11:28 AM CST -----  Contact: same  Type:  Test Results    Who Called:  patient  Name of Test (Lab/Mammo/Etc):  labs  Date of Test:  2/18//2020  Ordering Provider:  Whitley  Where the test was performed:  [address] [place] [address] [facility]  Best Call Back Number:  [phone]  Additional Information:  n/a

## 2020-04-23 NOTE — TELEPHONE ENCOUNTER
----- Message from Dana Jonas MA sent at 4/23/2020  2:09 PM CDT -----  Contact: patient  Type:  Patient Returning Call    Who Called:  Allison  Who Left Message for Patient:  unknown  Does the patient know what this is regarding?:  unknown  Best Call Back Number:    Additional Information:

## 2020-05-25 NOTE — DISCHARGE INSTRUCTIONS
 Nothing to eat or drink after midnight the night before your procedure.   Do not take any medications the morning of your procedure   Bring all your medications with you in the original pill bottles from pharmacy.   If you take blood thinners, ask your doctor if you should stop taking them.   Do not take metformin 24 hours prior to your procedure.   Do your Hibiclens wash the night before and morning of your procedure.   If you use a CPAP or BiPAP at home, please bring it with you the day of your procedure.   Make arrangements for someone you know to drive you home after your procedure. Taxi and Uber are not acceptable.     Tuesday 05/26/2020 08:00am

## 2020-05-26 NOTE — TRANSFER OF CARE
Anesthesia Transfer of Care Note    Patient: Douglas Valle    Procedure(s) Performed: Procedure(s) (LRB):  ECHOCARDIOGRAM,TRANSESOPHAGEAL (N/A)  CARDIOVERSION (N/A)    Patient location: Other: Heart center    Anesthesia Type: MAC    Transport from OR: Transported from OR on 2-3 L/min O2 by NC with adequate spontaneous ventilation    Post pain: adequate analgesia    Post assessment: no apparent anesthetic complications    Post vital signs: stable    Level of consciousness: awake and alert    Nausea/Vomiting: no nausea/vomiting    Complications: none    Transfer of care protocol was followed      Last vitals:   Visit Vitals  /63   Pulse 87   Temp 36.7 °C (98 °F)   Resp 16   SpO2 98%

## 2020-05-26 NOTE — DISCHARGE INSTRUCTIONS
KENAN and Cardioversion Discharge Instructions:   Start with soft foods, once you can tolerate soft foods easily, you may begin eating solid foods.    Ask your physician when you can return to your normal activities   Do not drive for at least 24 hours    SEEK CARE IMMEDIATELY IF:   You feel your heart beating fast or fluttering   You feel weak or faint   Your leg or arm is larger than usual, painful, or warm      CALL YOUR DOCTOR IMMEDIATELY IF:   You have fever or chills   You taste blood in your mouth   You have severe sore throat   You have difficulty swallowing   Your skin is itchy, swollen, or if you have a rash    You have questions or concerns about your condition or care.

## 2020-05-26 NOTE — BRIEF OP NOTE
The patient underwent transesophageal echocardiogram.  Left atrial appendage was not well visualized.  There was no obvious thrombus in the visualized appendage.  The patient has been on Xarelto without any skip doses.    The patient underwent synchronized 50, 150 and 200 joule biphasic shocks with the AP pads.  He continued in atrial fibrillation.  He then underwent synchronized biphasic shock with 200 joules x2 with the paddles.  He continued in atrial fibrillation.    Amiodarone will be increased to 200 mg b.i.d..  Metoprolol ER will be decreased to 25 mg daily.  Lisinopril will be decreased to 5 mg daily.  Potassium was 5.5 on 05/24/2020.  Repeat potassium today is 4.9 with BUN of 31 and creatinine of 1.7.  BUN creatinine 05/24/2020 where 40 in 2.1 respectively.    The patient will be seen in the office in approximately 2 weeks.  TSH.  It is unclear the patient has decreased Synthroid 75 mg daily as advised on his 02/07/2020 visit.

## 2020-05-26 NOTE — ANESTHESIA POSTPROCEDURE EVALUATION
Anesthesia Post Evaluation    Patient: Douglas Valle    Procedure(s) Performed: Procedure(s) (LRB):  ECHOCARDIOGRAM,TRANSESOPHAGEAL (N/A)  CARDIOVERSION (N/A)    Final Anesthesia Type: MAC    Patient location during evaluation: floor  Patient participation: Yes- Able to Participate  Level of consciousness: awake and alert  Post-procedure vital signs: reviewed and stable  Pain management: adequate  Airway patency: patent    PONV status at discharge: No PONV  Anesthetic complications: no      Cardiovascular status: stable  Respiratory status: unassisted  Hydration status: euvolemic  Follow-up not needed.          Vitals Value Taken Time   /50 5/26/2020 11:03 AM   Temp 36.7 °C (98 °F) 5/26/2020  8:39 AM   Pulse 71 5/26/2020 11:08 AM   Resp 17 5/26/2020 11:08 AM   SpO2 99 % 5/26/2020 11:08 AM   Vitals shown include unvalidated device data.      No case tracking events are documented in the log.      Pain/Dionicio Score: Dionicio Score: 10 (5/26/2020 11:04 AM)

## 2020-05-26 NOTE — ANESTHESIA PREPROCEDURE EVALUATION
"                                                                                                             05/26/2020  Douglas Valle is a 64 y.o., male.    Anesthesia Evaluation         Review of Systems  Anesthesia Hx:  History of prior surgery of interest to airway management or planning: heart surgery.   Social:  Former Smoker   Hematology/Oncology:         -- Anemia: Hematology Comments: Xarelto and Pletal   Cardiovascular:   Exercise tolerance: good Hypertension, well controlled Past MI CAD  CABG/stent Dysrhythmias atrial fibrillation     hyperlipidemia    Renal/:   Chronic Renal Disease, CRI    Hepatic/GI:   GERD (occ, none today)    Musculoskeletal:   Arthritis (gout)     Neurological:   Denies Seizures. H/o "leg seizures" (RLS?)   Endocrine:   Diabetes, well controlled, type 2 Hypothyroidism        Physical Exam  General:  Well nourished    Airway/Jaw/Neck:  Airway Findings: Mouth Opening: Normal Tongue: Normal  Mallampati: III  TM Distance: Normal, at least 6 cm  Jaw/Neck Findings:  Neck ROM: Normal ROM      Dental:  Dental Findings: In tact   Chest/Lungs:  Chest/Lungs Findings: Clear to auscultation, Normal Respiratory Rate     Heart/Vascular:  Heart Findings: Rate: Normal  Rhythm: Irregularly Irregular  Sounds: Normal  Heart murmur: negative       Mental Status:  Mental Status Findings:  Cooperative, Alert and Oriented         Anesthesia Plan  Type of Anesthesia, risks & benefits discussed:  Anesthesia Type:  MAC  Patient's Preference:   Intra-op Monitoring Plan: standard ASA monitors  Intra-op Monitoring Plan Comments:   Post Op Pain Control Plan:   Post Op Pain Control Plan Comments:   Induction:    Beta Blocker:         Informed Consent: Patient understands risks and agrees with Anesthesia plan.  Questions answered. Anesthesia consent signed with patient.  ASA Score: 3     Day of Surgery Review of History & Physical:        Anesthesia Plan Notes: Propofol only        Ready For Surgery From " Anesthesia Perspective.

## 2020-06-16 PROBLEM — Z12.5 ENCOUNTER FOR SCREENING FOR MALIGNANT NEOPLASM OF PROSTATE: Status: RESOLVED | Noted: 2020-01-01 | Resolved: 2020-01-01

## 2020-06-16 PROBLEM — S37.30XA TRAUMA OF URETHRA: Status: RESOLVED | Noted: 2018-12-27 | Resolved: 2020-01-01

## 2020-06-16 NOTE — PROGRESS NOTES
[facility] Note  Staff: Yris Arreaga MD  PCP: Kamilah Jackson   Cardiologist:  Dr. Cobb ([facility]    Chief Complaint: hematuria    Subjective:        HPI: Douglas Valle is a 64 y.o. male     Penile trauma/gross hematuria   Hematuria twice, once in sept and then again in oct, on xarelto and pletal.  Saw stanislaw on 11/6/18 who ordered a ctu (no obvious caus) and referred him to me. Cytology x 2 negative. 45 pack year h/o smoking. On further investigation he stated he has had mostly Penile bleeding not associated with hematuria.I saw him on 11/8/18 and found copious amounts of old blood in his depends. Exam of his penis showed blood dripping from his penis. He had no penile edema. No scrotal edema.  I then placed a 20fr coude which went easily and urine was essentially clear. RUG neg.  I removed the catheter and then performed a cytoscopy and I could see that he had some bleeding in the pendulous urethra, distal to the bulb and then placed catheter for healing.     He Underwent repeat cysto and bladder fulguration on 12/27/19. On the posterior bladder wall towards dome he had inflammation still.  I decided to fulgurate instead of bx bc he was on xarelto. Also had a strong family hx of prostate cancer. psa in 12/2018 was 3.3, was supposed to have repeat psa 3 months later.     Interval history 2/18/20  Repeat cysto on 4/15/19 showed no evidence of urethral stricture as result of penile trauma, noted to have large prostate with bph and circ intravesical median lobe and started him on flomax. Then asked him to f/u in 3 months with psa beforehand but he cancelled that appt.    Then he had a psa done at [facility] on 1/4/20 and now it's 4.5. ANSELMO 25g no nodules.   He is however urinating better on flomax and denies any weak stream.   Also still has ED despite use of 100 viagra. Started tadalafil 5mg daily     Interval history 06/16/2020:   psa repeat on 2/18 - screening 2.8 and psa free and  total 3.2, %free 33.13  cont'd flomax - 2/18/20 uroflow- avg flow: 7.1, voided vol: 157, pvr by scan: 24  Tadalafil 5mg daily - gets a good erection but poor duration. Enough to proceed with intercourse.   Denies any straining to urinate. Good flow. pvr by scan: 16cc    Ua void:   Small bili/ 100 prot  Urine history:  2/10/20 neg  12/21/18 Ng, void: n/a  11/13/18 Ng, void: tr leuk/3+bld  11/8/18 Ng, void: tr leuk/tr prot/250 blood  11/6/18 Multiple organisms, void: red/250 blood, cytology: negative  11/5/18 No cx, void: tr prot/3+blood, cytology: negative  9/20/18 Ng, void: 2+blood  5/16/15 Ng      psa history - 1st cousin with prostate cancer.   2/18/20 2.8 (screening)  2/18/20 3.2, %free 33.13  2/18/20 avg flow: 7.1, voided vol: 157, pvr by scan: 24  1/4/20  4.5 quest, ANSELMO 2/10/20: 25g no nodules  12/21/18 3.3, 11/8/18 ANSELMO: 45g (had cysto 11/8/18)      Review of Systems  General ROS: no fevers, no chills  Psychological ROS: no depression  Endocrine ROS: no diabetes  Respiratory ROS: no SOB  Cardiovascular ROS: no CP  Gastrointestinal ROS: no abdominal pain, no constipation, no diarrhea  Musculoskeletal ROS: no muscle pain  Neurological ROS: no headaches  Dermatological ROS: no rashes  HEENT: no glasses, no sinus   ROS: per HPI        PMHx:  Past Medical History:   Diagnosis Date    Anemia     Anticoagulant long-term use     Arthritis     Atrial fibrillation     BPH (benign prostatic hyperplasia)     CAD (coronary artery disease)     DM2 (diabetes mellitus, type 2)     Elevated homocysteine     Encounter for blood transfusion     Gout     HLD (hyperlipidemia)     HTN (hypertension)     Hypothyroid     Myocardial infarction     Seizures     Wears glasses      Kidney stones: No  Cataracts? None    PSHx:  Past Surgical History:   Procedure Laterality Date    CARDIAC SURGERY      CABG X 5  4-2000    CARDIOVERSION      CORONARY ANGIOPLASTY WITH STENT PLACEMENT  11/2014    CORONARY ARTERY BYPASS  GRAFT      CYSTOSCOPY N/A 2018    Procedure: CYSTOSCOPY;  Surgeon: Yris Arreaga MD;  Location: [facility] OR;  Service: Urology;  Laterality: N/A;    CYSTOSCOPY N/A 4/15/2019    Procedure: CYSTOSCOPY;  Surgeon: Yris Arreaga MD;  Location: [facility] OR;  Service: Urology;  Laterality: N/A;    TONSILLECTOMY      TREATMENT OF CARDIAC ARRHYTHMIA N/A 2020    Procedure: CARDIOVERSION;  Surgeon: Edward Cobb MD;  Location: [facility] CATH/EP LAB;  Service: Cardiology;  Laterality: N/A;     Cardiology: stents x 1, last one placed in , cabg in . afib on xarelto. PVD and on pletal.  Cardiologist:Dr.George Cobb - sees every 3 months  Nephrologist: karla    Colonoscopy: FOBT negative  never had a colonscopy-aunt  colon cancer    Fam Hx:   malignancies: No. Gyn cancer: mother with ovarian cancer diagnosed at end of life. Mother with colon cancer.  Mother  a 80. Father  a 84  kidney stones: No     Soc Hx:  Quit smoking 2000. 45 pack year hx of smoking  No alcohol  Lives in [place]  , here with wife Poly  2 children  Owns a box company    Allergies:  Patient has no known allergies.    Medications: sildenafil 100mg, flomax 0.4mg nightly   Anticoagulation: Yes - Pletal 100 mg one tablet daily, Xarelto 20 mg one tablet daily for afib    Objective:     Vitals:    20 1115   BP: (!) 86/52   Pulse: 77     General:WDWN in NAD  Eyes: PERRLA, normal conjunctiva  Respiratory: no increased work on breathing, clear to auscultation  Cardiovascular: regular rate and rhythm. No obvious extremity edema.  GI: palpation of masses. No tenderness. No hepatosplenomegaly to palpation.  Musculoskeletal: normal range of motion of bilateral upper extremities. Normal muscle strength and tone.  Skin: no obvious rashes or lesions. No tightening of skin noted.  Neurologic: CN grossly normal. Normal sensation.   Psychiatric: awake, alert and oriented x 3. Mood and affect normal.  Cooperative.     exam 11/6/18:   Inspection of anus normal  No scrotal rashes, cysts or lesions  Epididymis normal in size, no tenderness  Testes normal and size, equal size bilaterally, no masses  Urethral meatus normal without discharge  Penis is circumcised . Large clots   AR: 45g gland without masses, tenderness. SV not palpable. Normal sphincter tone. No hemhorroids.  No bilateral inguinal hernias noted     Ar: as above      LABS REVIEW:  Recent Labs   Lab 08/29/17  0936 11/05/18 2038 05/25/20  1207   WBC 7.70 7.90 5.99   Hemoglobin 15.8 14.0 13.6 L   Hematocrit 46.7 41.3 41.6   Platelets 165 183 136 L   ]  Recent Labs   Lab 08/29/17  0936 11/05/18 2038 05/25/20  1207 05/26/20  0820   Sodium 137 139 137 138   Potassium 4.8 4.2 5.5 H 4.9   Chloride 104 108 105 109   CO2 26 22 L 23 24   BUN, Bld 28 H 34 H 40 H 31 H   Creatinine 2.1 H 2.2 H 2.1 H 1.7 H   Glucose 108 104 94 107   Calcium 9.8 9.8 9.1 8.9   Alkaline Phosphatase 83 41 L 54 L  --    Total Protein 7.2 7.0 6.4  --    Albumin 3.6 4.1 3.9  --    Total Bilirubin 0.6 0.5 0.9  --    AST 20 16 17  --    ALT 29 11 18  --    ]    Lab Results   Component Value Date    HGBA1C 6.3 (H) 02/15/2015       Pathology: See hpi for recent   Cytology 11/6/18 and 11/5/18: negative for cancer cells    Radiology See hpi for recent   Rug 11/8/18  No extravasation of urine    ctu 11/7/18 images reviewed  1. No obstructive uropathy, stones or other acute  abnormality.  2. Left renal simple cyst.  3. Prostatomegaly.  Correlate with PSA.  4. Coronary artery disease.    Assessment:       1. Benign prostatic hyperplasia, unspecified whether lower urinary tract symptoms present    2. Erectile dysfunction, unspecified erectile dysfunction type          Plan:       · psa screening repeat came back lower. Now 2.8 from 4.5. let's plan to follow this. Repeat psa screening in 6 months (small prostate).   · Continue flomax 0.4mg nightly, doing well on this.   · Continue tadalafil 5mg  once a day. Good erection but poor duration. Ok to try an extra 5mg no more than once a week prior to intercourse to see if this helps. Do not take sildenafil/viagra w the tadalafil. If ED worsens, options limited due to xarelto use. Could recommend vacuum erection device. Or penile pump and I could refer to my partner but would need cardiac clearance and workup. Hold off on trying this until cardiac issues and bp resolved.     F/u in 6 months with psa screening prior for ANSELMO.     He does have low bp today on exam. He last saw  2 weeks ago (cards) and had attempted cardioversion but was unsuccessful. Had a shock/afib. He should repeat bp in the morning x 3 days and if still low call 's office for recommendations with bp readings. Some ligthheadedness.       [facility]:      Yris Arreaga,

## 2020-06-16 NOTE — PATIENT INSTRUCTIONS
· psa screening repeat came back lower. Now 2.8 from 4.5. let's plan to follow this. Repeat psa screening in 6 months (small prostate).   · Continue flomax 0.4mg nightly, doing well on this.   · Continue tadalafil 5mg once a day. Good erection but poor duration. Ok to try an extra 5mg no more than once a week prior to intercourse to see if this helps. Do not take sildenafil/viagra w the tadalafil. If ED worsens, options limited due to xarelto use. Could recommend vacuum erection device. Or penile pump and I could refer to my partner but would need cardiac clearance and workup. Hold off on trying this until cardiac issues and bp resolved.     F/u in 6 months with psa screening prior for ANSELMO.     He does have low bp today on exam. He last saw  2 weeks ago (cards) and had attempted cardioversion but was unsuccessful. Had a shock/afib. He should repeat bp in the morning x 3 days and if still low call 's office for recommendations with bp readings. Some ligthheadedness.

## 2020-10-16 NOTE — ED PROVIDER NOTES
Encounter Date: 10/16/2020    SCRIBE #1 NOTE: Jack PICKERING, am scribing for, and in the presence of, Lance Long MD.       History     Chief Complaint   Patient presents with    Chest Pain     x 3 weeks, described as heartburn , worse with after eating.       Time seen by provider: 9:58 AM on 10/16/2020    Douglas Valle is a 65 y.o. male with a PMHx of HTN, DM2, CAD, anemia, afib, and MI who presents to the ED with an onset of chest pain x3 weeks. The patient describes the pain as heartburn and claims that it occurs after he eats and feels like air trapped in his stomach. He notes that the pain is relieved with belching and usually subsides after 2-3 hours. He reports having deep chest pain last night after eating chicken tenders and potato salad.The patient denies fever or any other symptoms at this time. PSHx of CABG and coronary angioplasty with stent.      The history is provided by the patient and the spouse.     Review of patient's allergies indicates:  No Known Allergies  Past Medical History:   Diagnosis Date    Anemia     Anticoagulant long-term use     Arthritis     Atrial fibrillation     BPH (benign prostatic hyperplasia)     CAD (coronary artery disease)     DM2 (diabetes mellitus, type 2)     Elevated homocysteine     Encounter for blood transfusion     Gout     HLD (hyperlipidemia)     HTN (hypertension)     Hypothyroid     Myocardial infarction     Seizures     Wears glasses      Past Surgical History:   Procedure Laterality Date    CARDIAC SURGERY      CABG X 5  4-2000    CARDIOVERSION      CORONARY ANGIOPLASTY WITH STENT PLACEMENT  11/2014    CORONARY ARTERY BYPASS GRAFT      CYSTOSCOPY N/A 12/27/2018    Procedure: CYSTOSCOPY;  Surgeon: Yris Arreaga MD;  Location: [facility] OR;  Service: Urology;  Laterality: N/A;    CYSTOSCOPY N/A 4/15/2019    Procedure: CYSTOSCOPY;  Surgeon: Yris Arreaga MD;  Location: [facility] OR;  Service: Urology;  Laterality:  N/A;    TONSILLECTOMY      TREATMENT OF CARDIAC ARRHYTHMIA N/A 2020    Procedure: CARDIOVERSION;  Surgeon: Edward Cobb MD;  Location: [facility] CATH/EP LAB;  Service: Cardiology;  Laterality: N/A;     Family History   Problem Relation Age of Onset    Diabetes Mother     Hypertension Mother     Heart disease Father      Social History     Tobacco Use    Smoking status: Former Smoker     Packs/day: 3.00     Years: 30.00     Pack years: 90.00     Quit date: 2000     Years since quittin.5    Smokeless tobacco: Never Used   Substance Use Topics    Alcohol use: Not Currently    Drug use: Not Currently     Review of Systems   Constitutional: Negative for fever.   HENT: Negative for congestion.    Eyes: Negative for visual disturbance.   Respiratory: Negative for wheezing.    Cardiovascular: Positive for chest pain.   Gastrointestinal: Negative for abdominal pain.   Genitourinary: Negative for dysuria.   Musculoskeletal: Negative for joint swelling.   Skin: Negative for rash.   Neurological: Negative for syncope.   Hematological: Does not bruise/bleed easily.   Psychiatric/Behavioral: Negative for confusion.       Physical Exam     Initial Vitals [10/16/20 0933]   BP Pulse Resp Temp SpO2   (!) 152/78 89 18 98.5 °F (36.9 °C) 98 %      MAP       --         Physical Exam    Nursing note and vitals reviewed.  Constitutional: He appears well-nourished.   HENT:   Head: Normocephalic and atraumatic.   Eyes: Conjunctivae and EOM are normal.   Neck: Normal range of motion. Neck supple. No thyroid mass present.   Cardiovascular: Normal rate and normal heart sounds. An irregularly irregular rhythm present.  Exam reveals no gallop and no friction rub.    No murmur heard.  Pulmonary/Chest: Breath sounds normal. He has no wheezes. He has no rhonchi. He has no rales.   Abdominal: Soft. Normal appearance and bowel sounds are normal. There is abdominal tenderness in the epigastric area.   Epigastric tenderness to  palpation.   Neurological: He is alert and oriented to person, place, and time. He has normal strength. No cranial nerve deficit or sensory deficit.   Skin: Skin is warm and dry. No rash noted. No erythema.   Psychiatric: He has a normal mood and affect. His speech is normal. Cognition and memory are normal.         ED Course   Procedures  Labs Reviewed   CBC W/ AUTO DIFFERENTIAL - Abnormal; Notable for the following components:       Result Value    RDW 16.3 (*)     Immature Granulocytes 0.7 (*)     Immature Grans (Abs) 0.05 (*)     Lymph # 0.6 (*)     Gran% 80.6 (*)     Lymph% 8.3 (*)     All other components within normal limits   COMPREHENSIVE METABOLIC PANEL - Abnormal; Notable for the following components:    Creatinine 1.7 (*)     Alkaline Phosphatase 508 (*)      (*)      (*)     eGFR if  48 (*)     eGFR if non  41 (*)     All other components within normal limits   LIPASE   TROPONIN I     EKG Readings: (Independently Interpreted)   Initial Reading: No STEMI. Rhythm: Normal Sinus Rhythm. Heart Rate: 87. Axis: Right Axis Deviation.   Possible anterior infarct, age undertermined. Considered inferior ischemia. Nonspecific T wave abnormality. Lateral inversion noted on EKG (5/26/2020).     ECG Results          EKG 12-lead (In process)  Result time 10/16/20 12:35:44    In process by Interface, Lab In [facility] (10/16/20 12:35:44)                 Narrative:    Test Reason : R07.9,    Vent. Rate : 087 BPM     Atrial Rate : 094 BPM     P-R Int : 000 ms          QRS Dur : 116 ms      QT Int : 394 ms       P-R-T Axes : 000 111 -50 degrees     QTc Int : 474 ms    Atrial fibrillation  Right axis deviation  Possible Anterior infarct (cited on or before 26-MAY-2020)  T wave abnormality, consider inferior ischemia  Abnormal ECG  When compared with ECG of 26-MAY-2020 11:00,  No significant change was found    Referred By: [email or web address]   SELF           Confirmed By:                              Imaging Results          CT Abdomen Pelvis  Without Contrast (Final result)  Result time 10/16/20 12:49:47    Final result by Gricelda Cristina MD (10/16/20 12:49:47)                 Impression:      Spiculated cavitary left basilar lung mass highly suspicious for primary lung cancer and enlarged left-sided mediastinal lymph nodes.    Additional findings as detailed above including enlarged prostate gland, diverticulosis without CT findings of acute diverticulitis.    No acute abdominal findings.    Significant Alert:    The significant finding above was relayed by myself  by telephone to Dr. Long on 10/16/2020 at 12:40.    SIGNIFICANT FINDINGS:  Spiculated left basilar lung mass and enlarged mediastinal lymph nodes    RECOMMENDATIONS:  Next steps include referral for workup including chest CT with or without contrast as appropriate, PET-CT and/or/tissue sampling depending on the probability malignancy and comorbidities      Electronically signed by: Gricelda Cristina MD  Date:    10/16/2020  Time:    12:49             Narrative:    EXAMINATION:  CT ABDOMEN PELVIS WITHOUT CONTRAST    CLINICAL HISTORY:  Epigastric pain;Abdominal pain, acute, nonlocalized;    TECHNIQUE:  Low dose axial images, sagittal and coronal reformations were obtained from the lung bases to the pubic symphysis.  No p.o. or IV contrast    COMPARISON:  11/07/2018    FINDINGS:  There is cavitated spiculated 2.7 cm left basilar mass abutting the left hemidiaphragm.  This is new compared to the prior exam and corresponds to finding on chest x-ray of 10/16/2020.  There are also enlarged mediastinal nodes in the lower chest just anterior to the descending thoracic aorta and to the left of the esophagus with short axis diameter 11 mm and 7 mm and another with short axis diameter of 1.5 cm.    There is 3 mm nodule a postoperative changes suggesting CABG and calcifications and part suggesting pericardial calcifications that are not  significantly changed    Liver unremarkable appearance.    Spleen not enlarged    Adrenal glands unremarkable appearance    Pancreas unremarkable appearance    Abdominal aorta no aneurysm there are vascular calcifications in the abdominal aorta and major branches and there is left external iliac artery stent.    There are cholecystectomy clips.  No biliary duct dilatation    There is contrast or similar dense material within the colon and distal small bowel.  The appendix is not identified with certainty but no abnormal appendix inflammatory changes appendiceal region is seen.  No free intraperitoneal air or fluid.  There is diverticulosis without CT findings of acute diverticulitis.    4.2 cm left renal mass consistent with a cyst corresponding as seen on the prior exam.  Calcifications at the renal colten bilaterally appear vascular.  No hydronephrosis.  No opaque ureteral stone or ureteral obstruction evident.  Urinary bladder moderately distended at time of the exam and unremarkable appearance    Prostate gland enlarged measures 4.8 cm transversely    There are osseous degenerative changes along with disc space narrowing, disc desiccation, disc osteophyte complexes and multilevel spinal canal narrowing                               X-Ray Chest PA And Lateral (Final result)  Result time 10/16/20 10:35:19    Final result by Ti Akers MD (10/16/20 10:35:19)                 Impression:      Left basilar airspace disease could reflect atelectasis, aspirate or early pneumonia.      Electronically signed by: Ti Akers  Date:    10/16/2020  Time:    10:35             Narrative:    EXAMINATION:  XR CHEST PA AND LATERAL    CLINICAL HISTORY:  Other chest pain    TECHNIQUE:  PA and lateral views of the chest were performed.    COMPARISON:  08/21/2018 and 11/07/2018    FINDINGS:  Mild patchy airspace disease left lower lobe.  Linear calcification along the diaphragm correlates with pericardial calcification.   Normal size heart with prior CABG.  Atherosclerosis.  No pleural effusion or pneumothorax.  Sternal wires.                                 Medical Decision Making:   History:   Old Medical Records: I decided to obtain old medical records.  Independently Interpreted Test(s):   I have ordered and independently interpreted EKG Reading(s) - see prior notes  Clinical Tests:   Lab Tests: Ordered and Reviewed  Radiological Study: Ordered and Reviewed  Medical Tests: Ordered and Reviewed  ED Management:  This patient was emergently received and assessed upon arrival.  Vital signs are stable.  He is reporting postprandial epigastric burning radiating up the central chest primarily after eating.  He had resolution of symptoms here with a GI cocktail.  EKG is nonacute for ischemic waveforms.  Additional labs are found to be largely unremarkable for evidence of progressing ACS, end-organ dysfunction, dehydration, pancreatic inflammation.  CT scan of the abdomen pelvis was performed to rule out evidence of occult pancreatic inflammation, it indicates an area of spiculated mass in left lower lung with adjacent adenopathy concerning for a neoplastic process.  Patient has a remote history of smoking.  The patient and significant other were educated about these findings and will be asked to follow up with her primary care doctor and Hematology/Oncology as soon as possible regarding further diagnosis and management for this.  Will be discharged with a prescription for medication targeted towards gastritic symptoms.  He has however asked return to the ER immediately for any new, concerning, or worsening symptoms, including chest pain, difficulty breathing.  At this time have very low suspicion his epigastric burning after eating is associated with thromboembolic disease.  Patient and wife are agreeable with this plan for follow-up and was discharged in stable condition.            Scribe Attestation:   Scribe #1: I performed the above  scribed service and the documentation accurately describes the services I performed. I attest to the accuracy of the note.    I, Dr. Lance Long, personally performed the services described in this documentation. All medical record entries made by the scribe were at my direction and in my presence.  I have reviewed the chart and agree that the record reflects my personal performance and is accurate and complete. Lance Long MD.  3:55 PM 10/16/2020                    Clinical Impression:       ICD-10-CM ICD-9-CM   1. Mass of lower lobe of left lung  R91.8 786.6   2. Chest pain  R07.9 786.50   3. Atypical chest pain  R07.89 786.59   4. Epigastric pain  R10.13 789.06   5. Mediastinal lymphadenopathy  R59.0 785.6                      Disposition:   Disposition: Discharged  Condition: Stable     ED Disposition Condition    Discharge Stable        ED Prescriptions     Medication Sig Dispense Start Date End Date Auth. Provider    pantoprazole (PROTONIX) 20 MG tablet Take 1 tablet (20 mg total) by mouth once daily. 30 tablet 10/16/2020 11/15/2020 Lance Long MD        Follow-up Information     Follow up With Specialties Details Why Contact Info    Kamilah Jackson MD General Practice Schedule an appointment as soon as possible for a visit   [address]   [facility] [address]  [phone]      Alyse Paz MD Hematology and Oncology   [address] [facility] [address] [facility]  [address]  [place] [address]  [phone]      Earle Iqbal MD Hematology, Hematology and Oncology, Oncology   [address]  [address]  [place] [address]  [phone]      R Primo Robins MD Hematology, Oncology, Hematology and Oncology   [address]  [address]  [place] [address]  [phone]      Ti Sutton MD Hematology, Oncology, Hematology and Oncology   [address]  [address]  [place] [address]  [phone]                                         Lance Long MD  10/16/20 7243

## 2020-10-21 NOTE — TELEPHONE ENCOUNTER
Referral was received from Dr. Jackson to Dr. Iqbal, however the patient was dismissed from the practice back on 07/22/20216. I called the provider's office and spoke with Sincere to inform her that the referral was declined and to please refer the patient to another physician. AE

## 2020-11-02 NOTE — TELEPHONE ENCOUNTER
----- Message from Geovanna Michele, Patient Care Assistant sent at 11/2/2020  3:33 PM CST -----  Regarding: New Patient Appt.  Patient called in for an update on his new patient appt. Received a referral on 10/19/2020 from [facility]. He can be reached at [phone]

## 2020-11-03 NOTE — TELEPHONE ENCOUNTER
Spoke with patient in regards to his self scheduled NP appt with Dr. Paz.  Informed patient that we have all records needed at this time.   Address to this clinic given. Patient instructed to arrive no more than 10 minutes early and bring picture ID and insurance card(s).   Patient verbalized understanding.  No further questions/concerns noted at this time.

## 2020-11-03 NOTE — TELEPHONE ENCOUNTER
Pt self-scheduled on day Dr. Paz not in clinic, Charge Nurse TONEY Burrell notified. Pt ack and re-scheduled appt. Patient confirmed new appt time, location, and date. Pt had no further questions or concerns at this time. Staff sandy Zaidi RN to fu w/ new patient per request of Charge Nurse TONEY Burrell.

## 2020-11-10 NOTE — PROGRESS NOTES
Advice Only (LungCa/new patient)      Douglas Valle is a 65 y.o.  This is a 65 year old male who went to ER with abdominal pain and was found to have an incidental left lower lobe lung mass. He was referred to Dr Iqbal who refused to take the patient hence the patient was referred to [facility].     Past Medical History:   Diagnosis Date    Anemia     Anticoagulant long-term use     Arthritis     Atrial fibrillation     BPH (benign prostatic hyperplasia)     CAD (coronary artery disease)     DM2 (diabetes mellitus, type 2)     Elevated homocysteine     Encounter for blood transfusion     Gout     HLD (hyperlipidemia)     HTN (hypertension)     Hypothyroid     Myocardial infarction     Seizures     Wears glasses      Past Surgical History:   Procedure Laterality Date    CARDIAC SURGERY      CABG X 5  4-2000    CARDIOVERSION      CORONARY ANGIOPLASTY WITH STENT PLACEMENT  11/2014    CORONARY ARTERY BYPASS GRAFT      CYSTOSCOPY N/A 12/27/2018    Procedure: CYSTOSCOPY;  Surgeon: Yris Arreaga MD;  [facility];  Service: Urology;  Laterality: N/A;    CYSTOSCOPY N/A 4/15/2019    Procedure: CYSTOSCOPY;  Surgeon: Yris Arreaga MD;  [facility];  Service: Urology;  Laterality: N/A;    TONSILLECTOMY      TREATMENT OF CARDIAC ARRHYTHMIA N/A 5/26/2020    Procedure: CARDIOVERSION;  Surgeon: Edward Cobb MD;  Location: [facility] CATH/EP LAB;  Service: Cardiology;  Laterality: N/A;       Current Outpatient Medications:     allopurinoL (ZYLOPRIM) 300 MG tablet, , Disp: , Rfl:     amiodarone (PACERONE) 200 MG Tab, Take 1 tablet (200 mg total) by mouth 2 (two) times daily., Disp: 60 tablet, Rfl: 2    atorvastatin (LIPITOR) 20 MG tablet, Take 20 mg by mouth once daily. , Disp: , Rfl: 3    celecoxib (CELEBREX) 200 MG capsule, TAKE 1 CAPSULE BY MOUTH TWICE DAILY, Disp: 60 capsule, Rfl: 1    cilostazol (PLETAL) 100 MG Tab, Take 100 mg by mouth once daily. ,  Disp: , Rfl:     folic acid (FOLVITE) 1 MG tablet, Take 1 mg by mouth once daily., Disp: , Rfl:     gabapentin (NEURONTIN) 300 MG capsule, TAKE ONE CAPSULE BY MOUTH THREE TIMES A DAY (Patient taking differently: Take 300 mg by mouth 2 (two) times daily. ), Disp: 90 capsule, Rfl: 5    metformin (GLUCOPHAGE) 500 MG tablet, Take 500 mg by mouth daily with breakfast. , Disp: , Rfl:     metoprolol succinate (TOPROL-XL) 50 MG 24 hr tablet, Take 0.5 tablets (25 mg total) by mouth once daily., Disp: 30 tablet, Rfl: 2    pantoprazole (PROTONIX) 20 MG tablet, Take 1 tablet (20 mg total) by mouth once daily., Disp: 30 tablet, Rfl: 0    rivaroxaban (XARELTO) 20 mg Tab, Take 1 tablet (20 mg total) by mouth once daily., Disp: 30 tablet, Rfl: 3    SYNTHROID 100 mcg tablet, Take 100 mcg by mouth before breakfast. , Disp: , Rfl: 3    tadalafiL (CIALIS) 5 MG tablet, Take 1 tablet (5 mg total) by mouth once daily., Disp: 30 tablet, Rfl: 11    tamsulosin (FLOMAX) 0.4 mg Cap, Take 1 capsule (0.4 mg total) by mouth after dinner., Disp: 90 capsule, Rfl: 3    temazepam (RESTORIL) 30 mg capsule, TAKE ONE CAPSULE BY MOUTH AT BEDTIME (Patient taking differently: Take 30 mg by mouth nightly as needed for Insomnia. ), Disp: 30 capsule, Rfl: 0    allopurinol (ZYLOPRIM) 300 MG tablet, Take 300 mg by mouth once daily., Disp: , Rfl:     LIPITOR 20 mg tablet, , Disp: , Rfl:     lisinopriL 10 MG tablet, Take 0.5 tablets (5 mg total) by mouth once daily. (Patient not taking: Reported on 11/10/2020), Disp: 30 tablet, Rfl: 2    PACERONE 200 mg Tab, , Disp: , Rfl:   Review of patient's allergies indicates:  No Known Allergies  Social History     Tobacco Use    Smoking status: Former Smoker     Packs/day: 3.00     Years: 30.00     Pack years: 90.00     Quit date: 2000     Years since quittin.6    Smokeless tobacco: Never Used   Substance Use Topics    Alcohol use: Not Currently    Drug use: Not Currently     Family History  "  Problem Relation Age of Onset    Diabetes Mother     Hypertension Mother     Heart disease Father        CONSTITUTIONAL: No fevers, chills, night sweats, wt. loss, appetite changes  SKIN: no rashes or itching  ENT: No headaches, head trauma, vision changes, or eye pain  LYMPH NODES: None noticed   CV: No chest pain, palpitations.   RESP: No dyspnea on exertion, cough, wheezing, or hemoptysis  GI: No nausea, emesis, diarrhea, constipation, melena, hematochezia, pain.   : No dysuria, hematuria, urgency, or frequency   HEME: No easy bruising, bleeding problems  PSYCHIATRIC: No depression, anxiety, psychosis, hallucinations.  NEURO: No seizures, memory loss, dizziness or difficulty sleeping  MSK: No arthralgias or joint swelling         BP (!) 102/59 (BP Location: Right arm, Patient Position: Sitting, BP Method: Large (Automatic))   Pulse 81   Temp 97.4 °F (36.3 °C) (Temporal)   Resp 18   Ht 6' 1" (1.854 m)   Wt 108.8 kg (239 lb 13.8 oz)   SpO2 98%   BMI 31.65 kg/m²   Gen: NAD, A and O x3,   Psych: pleasant affect, normal thought process  Eyes: Pupils round and non dilated, EOM intact  Nose: Nares patent  OP clear, mucosa patent  Neck: suppple, no JVD, trachea midline, no palpable mass, no adenopathy  Lungs: CTAB, no wheezes, no use of accessory muscles  CV: S1S2 with RRR, No mrg  Abd: soft, NTND, + BS, No HSM, no ascites  Extr: No CCE, VIOLETTE, strength normal, good capillary refill  Neuro: steady gait, CNs grossly intact  Skin: intact, no lesions noted  Rheum: No joint swelling  Cranial Nerves:      II: Pupillary reflexes normal     III, IV, VI: EOM normal     V: 1,2,3: normal sensation     VII: Normal strength in all divisions     IX, X: Normal voice, palatal elevation and sensation     XI: Shoulder strength normal       XII: Tongue mobility normal    Lab Results   Component Value Date    WBC 7.07 10/16/2020    HGB 14.9 10/16/2020    HCT 46.2 10/16/2020    MCV 95 10/16/2020     10/16/2020 "         CMP  Sodium   Date Value Ref Range Status   10/16/2020 144 136 - 145 mmol/L Final     Potassium   Date Value Ref Range Status   10/16/2020 4.4 3.5 - 5.1 mmol/L Final     Chloride   Date Value Ref Range Status   10/16/2020 109 95 - 110 mmol/L Final     CO2   Date Value Ref Range Status   10/16/2020 26 23 - 29 mmol/L Final     Glucose   Date Value Ref Range Status   10/16/2020 104 70 - 110 mg/dL Final     BUN   Date Value Ref Range Status   10/16/2020 20 8 - 23 mg/dL Final     Creatinine   Date Value Ref Range Status   10/16/2020 1.7 (H) 0.5 - 1.4 mg/dL Final     Calcium   Date Value Ref Range Status   10/16/2020 9.3 8.7 - 10.5 mg/dL Final     Total Protein   Date Value Ref Range Status   10/16/2020 7.2 6.0 - 8.4 g/dL Final     Albumin   Date Value Ref Range Status   10/16/2020 3.8 3.5 - 5.2 g/dL Final     Total Bilirubin   Date Value Ref Range Status   10/16/2020 0.7 0.1 - 1.0 mg/dL Final     Comment:     For infants and newborns, interpretation of results should be based  on gestational age, weight and in agreement with clinical  observations.  Premature Infant recommended reference ranges:  Up to 24 hours.............<8.0 mg/dL  Up to 48 hours............<12.0 mg/dL  3-5 days..................<15.0 mg/dL  6-29 days.................<15.0 mg/dL       Alkaline Phosphatase   Date Value Ref Range Status   10/16/2020 508 (H) 55 - 135 U/L Final     AST   Date Value Ref Range Status   10/16/2020 101 (H) 10 - 40 U/L Final     ALT   Date Value Ref Range Status   10/16/2020 193 (H) 10 - 44 U/L Final     Anion Gap   Date Value Ref Range Status   10/16/2020 9 8 - 16 mmol/L Final     eGFR if    Date Value Ref Range Status   10/16/2020 48 (A) >60 mL/min/1.73 m^2 Final     eGFR if non    Date Value Ref Range Status   10/16/2020 41 (A) >60 mL/min/1.73 m^2 Final     Comment:     Calculation used to obtain the estimated glomerular filtration  rate (eGFR) is the CKD-EPI equation.          Mass  of left lung  -     CT Chest With Contrast; Future; Expected date: 11/10/2020  -     CT Biopsy Lung; Future; Expected date: 11/10/2020    Mediastinal adenopathy    Gastritis, presence of bleeding unspecified, unspecified chronicity, unspecified gastritis type  -     Ambulatory referral/consult to Gastroenterology; Future; Expected date: 11/17/2020      Ct chest for further evaluation of whether he has numerous lesions for accurate assessment. Proceed with biopsy of lung mass   To Gi for gastritis   RTC after biopsy and CT to discuss results     Encouraged the use of My Chart for communications and notifications     Recommend healthy living: no nicotine,  should avoid alcohol, healthy diet and regular exercise aiming for fitness, and weight control  1. Discussed healthy alcohol daily limit of 0.5 oz of pure alcohol in any 24 hours (roughly one 12-oz beers, 4 oz of wine (8%-12% alcohol), or 1.25 oz (half a shot) of liquor (80 proof)), can not save up.  2.   Discussed good exercise program, 4 key elements: 1. Aerobic (walking, swimming, dancing, jogging, biking, etc, 2. Muscle strengthening / resistance exercise, need to do 2-3 times  weekly, 3. Stretching daily, good stretch takes a whole  total minute. 4. Balance exercise daily.  3.   Discussed healthy diet for over 15 minutes with handouts given to the patient     Discussed COVID-19 and social distancing in great detail, avoid all non-essential visits out of the home if possible and avoid sick contacts.     Thank you for allowing me to evaluate and participate in the care of this pleasant patient. Please fell free to call me with any questions or concerns.    [place],  Alyse Paz MD    This note was dictated with Dragon and briefly proofread. Please excuse any sentences that may be unclear or words misspelled

## 2020-11-10 NOTE — PATIENT INSTRUCTIONS
Electrolytes are minerals that conduct an electrical charge when mixed with water. They help regulate a variety of your bodys most essential functions, including nerve signaling, pH balance, muscle contraction, and hydration (1[employer]usted Source).    The primary electrolytes that your body uses to carry out these vital functions are sodium, potassium, magnesium, calcium, phosphorus, chloride, and bicarbonate (1Trusted Source).    The concentration of electrolytes in your blood and other bodily fluids is maintained within a very tight range. If your electrolyte levels become too high or too low, serious health complications can arise.    Daily electrolyte and fluid losses occur naturally through sweat and other waste products. Therefore, its important to regularly replenish them with a mineral-rich diet.    However, certain activities or situations -- such as heavy exercise or bouts of diarrhea or vomiting -- can increase how many electrolytes you lose and may warrant the addition of an electrolyte drink to your routine.    Here are 8 electrolyte-rich beverages you may want to add to your health and wellness tool kit.    1. Coconut water  Coconut water, or coconut juice, is the clear liquid found inside of a coconut.    Over the past several years, it has become one of the most popular beverages on the market, and its now bottled and sold worldwide.    Coconut water is naturally low in sugar and contains a variety of electrolytes, including sodium, potassium, calcium, and magnesium (2[employer]).    At 46 calories per cup (237 ml), its also a healthier alternative to sodas, juices, and traditional sports drinks (2[employer] Source).    2. Milk  When it comes to electrolyte drinks, cows milk is somewhat of an unsung hero. Contrary to popular belief, milk can be used for a lot more than breakfast cereal or coffee.    In addition to its rich supply of electrolytes like calcium, sodium, and potassium, milk provides a  healthy combination of carbs and protein. These two macronutrients can help you refuel and promote muscle tissue repair after a workout (3Trusted Source, 4Trusted Source).    Some research suggests that these characteristics could make milk a better post-workout beverage than many commercial sports drinks -- and at a fraction of the price (5[employer]usted Source).    Given that milks benefits are driven by its electrolyte, carb, and protein content, you may choose whole, low-fat, or skim milk, depending on your personal preference.    Its worth noting that regular cows milk may not be the right choice for everyone -- especially those who are following a vegan diet or intolerant to dairy products.    If youre lactose intolerant but still want to include milk in your workout recovery regimen, opt for a lactose-free version.    Meanwhile, if you adhere to a vegan diet or have a milk protein allergy, you should avoid milk completely.    While plant-based alternatives likely wont offer the same benefits as cows milk, some research has shown that the protein in soy milk may aid muscle repair while providing an electrolyte profile similar to that of cows milk    3. Watermelon water (and other fruit juices)  Though the name may suggest otherwise, watermelon water is simply the juice that comes from a watermelon.    One cup (237 ml) of 100% watermelon juice provides almost 6% of the Daily Value (DV) for potassium and magnesium while offering small amounts of other electrolytes like calcium and phosphorus (8Trusted Source).    Watermelon juice also contains L-citrulline. When used at supplemental doses, this amino acid may enhance oxygen transport and athletic performance (9Trusted Source).    However, current research suggests that the amount of L-citrulline in regular watermelon juice probably isnt enough to have any measurable effect on exercise performance (10Trusted Source, 11Trusted Source).    Other types of fruit  juice can be a good source of electrolytes, too. For example, orange and tart cherry juice also contain potassium, magnesium, and phosphorus (12Trusted Source, 13Trusted Source).    Plus, 100% fruit juice doubles as a great source of vitamins and antioxidants (14Trusted Source, 15Trusted Source).    One of the main drawbacks of using fruit juice as an electrolyte replacement drink is that its typically low in sodium.    If youre sweating for a prolonged period and attempt to rehydrate with a beverage that doesnt contain sodium, you risk developing low sodium blood levels (16Trusted Source).    To mitigate this risk, some people like to make their own sports drinks using a combination of fruit juices, salt, and water.    4. Smoothies  Smoothies are an excellent way to mix a variety of electrolyte-rich foods into one drinkable concoction.    Some of the best sources of electrolytes come from whole foods like fruits, vegetables, nuts, seeds, legumes, and dairy products -- all of which can be blended to make a delicious and nutritious smoothie.    If youre getting over a stomach bug and want to replace lost electrolytes, a smoothie may be easier to digest and more appetizing than many of the aforementioned foods on their own.    Smoothies are also a great option for anyone looking for a post-workout recovery drink. They can not only replace lost electrolytes but also be a good way to support muscle tissue growth and repair if you include some protein-rich additions.    However, a smoothie may not be the best option if youre looking for an electrolyte drink to consume in the middle of heavy or prolonged exercise.    Thats because it has the potential to leave you feeling too full to comfortably complete your workout. Thus, its probably best reserved for at least 1 hour before or immediately following your exercise routine.      5. Electrolyte-infused melvin  Electrolyte-infused water can be a great, low-calorie  way to replenish electrolytes and keep you well hydrated.    Still, not all electrolyte melvin are created equal.    In the [place], most standard tap water contains about 2-3% of your daily needs for certain electrolytes, such as sodium, calcium, and magnesium (17).    Interestingly, certain brands of electrolyte-enhanced bottled water can be very costly and dont contain significantly more electrolytes -- and in some cases even less.    That said, some brands are specifically designed to assist with hydration and mineral replacement and contain higher quantities of electrolytes. These are more likely to be worth your money, depending on why youre drinking an electrolyte beverage in the first place.    Keep in mind that these kinds of melvin are also likely to be packed with sugar, as many of them are designed to replenish carb stores during prolonged exercise. If youre not in the market for those extra sugar calories, opt for brands with little or no added sugar.    You may also try adding freshly cut or muddled fruit and herbs to your water bottle to create your own flavored, electrolyte-infused water.    6. Electrolyte tablets  Electrolyte tablets are a convenient, inexpensive, and portable way to make your own electrolyte drink no matter where you are.    All you have to do is drop one of the tablets in some water and shake or stir to mix.    Most electrolyte tablets contain sodium, potassium, magnesium, and calcium -- though the exact quantities may vary depending on the brand.    They also tend to be low calorie, have little to no added sugar, and come in a variety of unique, fruity flavors.    Certain brands of electrolyte tablets may also contain caffeine or supplemental doses of vitamins, so be sure to check the label if you want to avoid any of those extra ingredients.    If you cant find electrolyte tablets locally or are hoping for a more affordable price, theyre widely available online.    7.  Sports drinks  Commercially sold sports drinks like Gatorade and Powerade have been among the most popular electrolyte drinks on the market since the 1980s.    These beverages can come in handy for endurance athletes who need the combination of easily digestible carbs, fluid, and electrolytes to maintain hydration and energy throughout an athletic event or training session.    Yet, commercial sports drinks also carry some major drawbacks. They tend to contain a lot of artificial colors, flavors, and added sugar, which arent wholly necessary for anyone -- whether youre an athlete or not.    In fact, a 12-ounce (355-ml) serving of Gatorade or Powerade contains over 20 grams of added sugar. Thats more than half of the daily recommended amount (18Trusted Source, 19Trusted Source, 20).    Plus, sugar-free versions may not be a much better alternative.    Though they dont contain added sugar and have fewer calories, they usually contain sugar alcohols or artificial sweeteners instead. These sweeteners may contribute to uncomfortable digestive symptoms, such as gas and bloating in some people (21Trusted Source, 22Trusted Source).    One simple way to avoid the less-than-favorable ingredients in sports drinks is to make your own.    Simply use a combination of 100% fruit juice, coconut water, and a pinch of salt to create a healthier electrolyte beverage without the artificial ingredients and added sugar.      8. Pedialyte  Pedialyte is a commercial electrolyte drink marketed for children, but adults may use it, too.    Its designed to be a rehydration supplement when youre experiencing fluid losses due to diarrhea or vomiting. Its much lower in sugar than a typical sports drink, and sodium, chloride, and potassium are the only electrolytes it includes.    Each variety contains only 9 grams of sugar, but the flavored options also contain artificial sweeteners. If you want to avoid artificial sweeteners, opt for an  unflavored version (2

## 2020-11-12 NOTE — TELEPHONE ENCOUNTER
Confirmed patient's CT lung biopsy and fu ov with Dr. Paz 11/24 with pt. Pt had no further questions or concerns.

## 2020-11-20 PROBLEM — R91.8 MASS OF LEFT LUNG: Status: ACTIVE | Noted: 2020-01-01

## 2020-11-24 NOTE — PROGRESS NOTES
Follow-up (Lung Bx 11/20 results)      Douglas Valle is a 65 y.o.  This is a 65 year old male who went to ER with abdominal pain and was found to have an incidental left lower lobe lung mass. He was referred to Dr Iqbal who refused to take the patient hence the patient was referred to [facility].  He has undergone a biopsy of the left lower lobe lung revealing squamous cell carcinoma. He does have a satellite nodule in the same lobe. He has hilar and mediastinal adenopathy ipsilateral noted on Ct scans    Past Medical History:   Diagnosis Date    Anemia     Anticoagulant long-term use     pt takes Xarelto and has stopped Pletal on his own 11/16/2020    Arthritis     Atrial fibrillation     BPH (benign prostatic hyperplasia)     CAD (coronary artery disease) 04/2000    5 vessel bypass    DM2 (diabetes mellitus, type 2)     Elevated homocysteine     Encounter for blood transfusion     Gout     HLD (hyperlipidemia)     HTN (hypertension)     Hypothyroid     Myocardial infarction     Restless leg syndrome, controlled     Wears glasses      Past Surgical History:   Procedure Laterality Date    CARDIAC SURGERY      CABG X 5  4-2000    CARDIOVERSION      CORONARY ANGIOPLASTY WITH STENT PLACEMENT  11/2014    CORONARY ARTERY BYPASS GRAFT      CYSTOSCOPY N/A 12/27/2018    Procedure: CYSTOSCOPY;  Surgeon: Yris Arreaga MD;  Location: [facility] OR;  Service: Urology;  Laterality: N/A;    CYSTOSCOPY N/A 4/15/2019    Procedure: CYSTOSCOPY;  Surgeon: Yris Arreaga MD;  Location: [facility] OR;  Service: Urology;  Laterality: N/A;    TONSILLECTOMY      TREATMENT OF CARDIAC ARRHYTHMIA N/A 5/26/2020    Procedure: CARDIOVERSION;  Surgeon: Edward Cobb MD;  Location: [facility] CATH/EP LAB;  Service: Cardiology;  Laterality: N/A;       Current Outpatient Medications:     allopurinoL (ZYLOPRIM) 300 MG tablet, , Disp: , Rfl:     amiodarone (PACERONE) 200 MG Tab, Take 1 tablet (200 mg  total) by mouth 2 (two) times daily., Disp: 60 tablet, Rfl: 2    atorvastatin (LIPITOR) 20 MG tablet, Take 20 mg by mouth once daily. , Disp: , Rfl: 3    celecoxib (CELEBREX) 200 MG capsule, TAKE 1 CAPSULE BY MOUTH TWICE DAILY, Disp: 60 capsule, Rfl: 1    metformin (GLUCOPHAGE) 500 MG tablet, Take 500 mg by mouth daily with breakfast. , Disp: , Rfl:     metoprolol succinate (TOPROL-XL) 50 MG 24 hr tablet, Take 0.5 tablets (25 mg total) by mouth once daily., Disp: 30 tablet, Rfl: 2    rivaroxaban (XARELTO) 20 mg Tab, Take 1 tablet (20 mg total) by mouth once daily., Disp: 30 tablet, Rfl: 3    SYNTHROID 100 mcg tablet, Take 100 mcg by mouth before breakfast. , Disp: , Rfl: 3    tadalafiL (CIALIS) 5 MG tablet, Take 1 tablet (5 mg total) by mouth once daily., Disp: 30 tablet, Rfl: 11    tamsulosin (FLOMAX) 0.4 mg Cap, Take 1 capsule (0.4 mg total) by mouth after dinner., Disp: 90 capsule, Rfl: 3    temazepam (RESTORIL) 30 mg capsule, TAKE ONE CAPSULE BY MOUTH AT BEDTIME (Patient taking differently: Take 30 mg by mouth nightly as needed for Insomnia. ), Disp: 30 capsule, Rfl: 0  Review of patient's allergies indicates:  No Known Allergies  Social History     Tobacco Use    Smoking status: Former Smoker     Packs/day: 3.00     Years: 30.00     Pack years: 90.00     Quit date: 2000     Years since quittin.6    Smokeless tobacco: Never Used   Substance Use Topics    Alcohol use: Not Currently    Drug use: Not Currently     Family History   Problem Relation Age of Onset    Diabetes Mother     Hypertension Mother     Heart disease Father        CONSTITUTIONAL: No fevers, chills, night sweats, wt. loss, appetite changes  SKIN: no rashes or itching  ENT: No headaches, head trauma, vision changes, or eye pain  LYMPH NODES: None noticed   CV: No chest pain, palpitations.   RESP: No dyspnea on exertion, cough, wheezing, or hemoptysis  GI: No nausea, emesis, diarrhea, constipation, melena, hematochezia,  "pain.   : No dysuria, hematuria, urgency, or frequency   HEME: No easy bruising, bleeding problems  PSYCHIATRIC: No depression, anxiety, psychosis, hallucinations.  NEURO: No seizures, memory loss, dizziness or difficulty sleeping  MSK: No arthralgias or joint swelling         /73 (BP Location: Right arm, Patient Position: Sitting, BP Method: Large (Automatic))   Pulse 102   Temp 98.7 °F (37.1 °C) (Temporal)   Resp 18   Ht 6' 1" (1.854 m)   Wt 109.2 kg (240 lb 11.9 oz)   SpO2 96%   BMI 31.76 kg/m²   Gen: NAD, A and O x3,   Psych: pleasant affect, normal thought process  Eyes: Pupils round and non dilated, EOM intact  Nose: Nares patent  OP clear, mucosa patent  Neck: suppple, no JVD, trachea midline, no palpable mass, no adenopathy  Lungs: CTAB, no wheezes, no use of accessory muscles  CV: S1S2 with RRR, No mrg  Abd: soft, NTND, + BS, No HSM, no ascites  Extr: No CCE, VIOLETTE, strength normal, good capillary refill  Neuro: steady gait, CNs grossly intact  Skin: intact, no lesions noted  Rheum: No joint swelling  Cranial Nerves:      II: Pupillary reflexes normal     III, IV, VI: EOM normal     V: 1,2,3: normal sensation     VII: Normal strength in all divisions     IX, X: Normal voice, palatal elevation and sensation     XI: Shoulder strength normal       XII: Tongue mobility normal    Lab Results   Component Value Date    WBC 7.77 11/20/2020    HGB 16.0 11/20/2020    HCT 48.4 11/20/2020    MCV 92 11/20/2020     11/20/2020         CMP  Sodium   Date Value Ref Range Status   10/16/2020 144 136 - 145 mmol/L Final     Potassium   Date Value Ref Range Status   10/16/2020 4.4 3.5 - 5.1 mmol/L Final     Chloride   Date Value Ref Range Status   10/16/2020 109 95 - 110 mmol/L Final     CO2   Date Value Ref Range Status   10/16/2020 26 23 - 29 mmol/L Final     Glucose   Date Value Ref Range Status   10/16/2020 104 70 - 110 mg/dL Final     BUN   Date Value Ref Range Status   11/20/2020 14 9 - 21 mg/dL Final "     Creatinine   Date Value Ref Range Status   11/20/2020 1.32 0.50 - 1.40 mg/dL Final     Calcium   Date Value Ref Range Status   10/16/2020 9.3 8.7 - 10.5 mg/dL Final     Total Protein   Date Value Ref Range Status   10/16/2020 7.2 6.0 - 8.4 g/dL Final     Albumin   Date Value Ref Range Status   10/16/2020 3.8 3.5 - 5.2 g/dL Final     Total Bilirubin   Date Value Ref Range Status   10/16/2020 0.7 0.1 - 1.0 mg/dL Final     Comment:     For infants and newborns, interpretation of results should be based  on gestational age, weight and in agreement with clinical  observations.  Premature Infant recommended reference ranges:  Up to 24 hours.............<8.0 mg/dL  Up to 48 hours............<12.0 mg/dL  3-5 days..................<15.0 mg/dL  6-29 days.................<15.0 mg/dL       Alkaline Phosphatase   Date Value Ref Range Status   10/16/2020 508 (H) 55 - 135 U/L Final     AST   Date Value Ref Range Status   10/16/2020 101 (H) 10 - 40 U/L Final     ALT   Date Value Ref Range Status   10/16/2020 193 (H) 10 - 44 U/L Final     Anion Gap   Date Value Ref Range Status   10/16/2020 9 8 - 16 mmol/L Final     eGFR if    Date Value Ref Range Status   11/20/2020 >60 >60 mL/min/1.73 m^2 Final     eGFR if non    Date Value Ref Range Status   11/20/2020 56 (A) >60 mL/min/1.73 m^2 Final     Comment:     Calculation used to obtain the estimated glomerular filtration  rate (eGFR) is the CKD-EPI equation.          Primary lung squamous cell carcinoma, left  -     Ambulatory referral/consult to Cardiovascular Surgery; Future; Expected date: 12/01/2020  -     NM PET CT Routine Skull to Mid Thigh    Hilar adenopathy  -     NM PET CT Routine Skull to Mid Thigh    Mediastinal adenopathy  -     NM PET CT Routine Skull to Mid Thigh    Malignant neoplasm of lower lobe, left bronchus or lung   -     NM PET CT Routine Skull to Mid Thigh         Proceed with mediastinal and hilar lymph node sampling : see   Clarence Hardy  Proceed with pet ct  RTc after seing Dr Hardy   Staging discussed  Treatment possibly concurrent radiation and chemo   Encouraged the use of My Chart for communications and notifications     Recommend healthy living: no nicotine,  should avoid alcohol, healthy diet and regular exercise aiming for fitness, and weight control  1. Discussed healthy alcohol daily limit of 0.5 oz of pure alcohol in any 24 hours (roughly one 12-oz beers, 4 oz of wine (8%-12% alcohol), or 1.25 oz (half a shot) of liquor (80 proof)), can not save up.  2.   Discussed good exercise program, 4 key elements: 1. Aerobic (walking, swimming, dancing, jogging, biking, etc, 2. Muscle strengthening / resistance exercise, need to do 2-3 times  weekly, 3. Stretching daily, good stretch takes a whole  total minute. 4. Balance exercise daily.  3.   Discussed healthy diet for over 15 minutes with handouts given to the patient     Discussed COVID-19 and social distancing in great detail, avoid all non-essential visits out of the home if possible and avoid sick contacts.     Thank you for allowing me to evaluate and participate in the care of this pleasant patient. Please fell free to call me with any questions or concerns.    Warmly,  Alyse Paz MD    This note was dictated with Dragon and briefly proofread. Please excuse any sentences that may be unclear or words misspelled

## 2020-11-25 NOTE — TELEPHONE ENCOUNTER
Called and spoke with pt over phone confirming his pet appt. Scheduled fu ov with dr. Paz. Contacted dr. Clarence hardy (cardiothoracic sx at P#[phone]) to set up pt with an appointment. Dr. Hardy's call service took message and will forward to office.

## 2020-12-01 NOTE — TELEPHONE ENCOUNTER
Spoke with patient in-person in clinic today and let him know that Dr. Hardy's office had been reached today and that an appointment for mediastinal sampling had been set up for him Thurs Dec 10 at 11:45 am. Gave pt Dr. Hardy's Office contact information P [phone] and let pt know Dr. Hardy's office should be in contact with him but if not to call that number. Faxed requested paperwork to Dr. Hardy's office at [phone]. Dr. Hardy's office is located at [address] [place] [address] [address] [facility] [address]

## 2020-12-02 NOTE — TELEPHONE ENCOUNTER
Spoke with patient regarding message below--patient sts that when he called to confirm Dr Hardy' appt tomorrow--they mentioned him needing to bring CT & XRAY disc along with Biopsy--asked him to contact Dr Hardy' office again to see if just the reports are sufficient & if not, he will have to request disc from Medical Records @ [phone]--patient voiced understanding & will let us know if he has any other questions

## 2020-12-02 NOTE — TELEPHONE ENCOUNTER
----- Message from Amelia Narvaez sent at 12/2/2020  1:24 PM CST -----  Contact: HANNAH JOHNS [[id number]]  @[phone]  Calling in ref to his chest xray  and the latest test results reports .

## 2020-12-04 NOTE — TELEPHONE ENCOUNTER
Faxed requested CT Lung Biopsy from 11/20/20 to Dr. Hardy's Office at F#[phone]. Attempted to call back Dr. Hardy's office but wrong number had been recorded by call center.

## 2020-12-07 NOTE — TELEPHONE ENCOUNTER
Spoke with Jade from Dr. Hardy's Office. Confirmed CT Lung Biopsy had been received but not pathology. Faxed Pathology Report. No further questions or concerns at this time.     Fax confirmation:

## 2020-12-07 NOTE — TELEPHONE ENCOUNTER
Recvd fax from [employer] regarding PET scan AUTH--APPROVED 12/10/2020-1/10/2021--Auth # [id number]--Xqngrryn to [facility]--gave to TONEY Zaidi to scan to system

## 2020-12-10 NOTE — PROGRESS NOTES
The patient location is: home  Date of Service: 12/10/2020   The chief complaint leading to consultation is: see hpi and visit diagnosis  Visit type: Virtual visit with Real Time synchronous AUDIO only     Each patient to whom he or she provides medical services by telemedicine is:    (1) informed of the relationship between the physician and patient and the respective role of any other health care provider with respect to management of the patient; and   (2) notified that he or she may decline to receive medical services by telemedicine and may withdraw from such care at any time.    [facility] Note  Staff: Yris Arreaga MD  PCP: Kamilah Jackson   Cardiologist:  Dr. Cobb ([facility]    Chief Complaint: hematuria    Subjective:        HPI: Douglas Valle is a 65 y.o. male     Penile trauma/gross hematuria   Hematuria twice, once in sept and then again in oct, on xarelto and pletal.  Saw stanislaw on 11/6/18 who ordered a ctu (no obvious caus) and referred him to me. Cytology x 2 negative. 45 pack year h/o smoking. On further investigation he stated he has had mostly Penile bleeding not associated with hematuria.I saw him on 11/8/18 and found copious amounts of old blood in his depends. Exam of his penis showed blood dripping from his penis. He had no penile edema. No scrotal edema.  I then placed a 20fr coude which went easily and urine was essentially clear. RUG neg.  I removed the catheter and then performed a cytoscopy and I could see that he had some bleeding in the pendulous urethra, distal to the bulb and then placed catheter for healing.     He Underwent repeat cysto and bladder fulguration on 12/27/19. On the posterior bladder wall towards dome he had inflammation still.  I decided to fulgurate instead of bx bc he was on xarelto. Also had a strong family hx of prostate cancer. psa in 12/2018 was 3.3, was supposed to have repeat psa 3 months later.     Interval history  2/18/20  Repeat cysto on 4/15/19 showed no evidence of urethral stricture as result of penile trauma, noted to have large prostate with bph and circ intravesical median lobe and started him on flomax. Then asked him to f/u in 3 months with psa beforehand but he cancelled that appt.    Then he had a psa done at [facility] on 1/4/20 and now it's 4.5. ANSELMO 25g no nodules.   He is however urinating better on flomax and denies any weak stream.   Also still has ED despite use of 100 viagra. Started tadalafil 5mg daily     Interval history 6/16/20  psa repeat on 2/18 - screening 2.8 and psa free and total 3.2, %free 33.13  cont'd flomax - 2/18/20 uroflow- avg flow: 7.1, voided vol: 157, pvr by scan: 24  Tadalafil 5mg daily - gets a good erection but poor duration. Enough to proceed with intercourse.   Denies any straining to urinate. Good flow. pvr by scan: 16cc    Interval history 12/10/20:   Since last visit pt had severe chest pain and was found to have a lung nodule and lymphadenopathy. Lung biopsy showed squamous carcinoma. Has plans to see  for possibly radiation only.    Had him repeat his psa which came down to 3.6 (has been 3.3 in 2018).     On flomax 0.4mg nightly for bph. Notices that if he stops using he has weak stream. Wants refill.   On tadalafil 5mg daily for Ed and then he takes 2 more when he wants to have intercourse. Able to have intercourse for 15-20 min when he does this.  Helping some and wants to know of other options. Wants refill for now.     Urine history:  6/16/20 Small bili/100 prot  2/10/20 neg  12/21/18 Ng, void: n/a  11/13/18 Ng, void: tr leuk/3+bld  11/8/18 Ng, void: tr leuk/tr prot/250 blood  11/6/18 Multiple organisms, void: red/250 blood, cytology: negative  11/5/18 No cx, void: tr prot/3+blood, cytology: negative  9/20/18 Ng, void: 2+blood  5/16/15 Ng      psa history - 1st cousin with prostate cancer.   12/7/20 3.6 (screening)   2/18/20 2.8 (screening)  2/18/20 3.2, %free  33.13  2/18/20 avg flow: 7.1, voided vol: 157, pvr by scan: 24  1/4/20  4.5 quest, ANSELMO 2/10/20: 25g no nodules  12/21/18 3.3, 11/8/18 ANSELMO: 45g (had cysto 11/8/18)      Review of Systems  General ROS: no fevers, no chills  Psychological ROS: no depression  Endocrine ROS: no diabetes  Respiratory ROS: no SOB  Cardiovascular ROS: no CP  Gastrointestinal ROS: no abdominal pain, no constipation, no diarrhea  Musculoskeletal ROS: no muscle pain  Neurological ROS: no headaches  Dermatological ROS: no rashes  HEENT: no glasses, no sinus   ROS: per HPI        PMHx:  Past Medical History:   Diagnosis Date    Anemia     Anticoagulant long-term use     pt takes Xarelto and has stopped Pletal on his own 11/16/2020    Arthritis     Atrial fibrillation     BPH (benign prostatic hyperplasia)     CAD (coronary artery disease) 04/2000    5 vessel bypass    DM2 (diabetes mellitus, type 2)     Elevated homocysteine     Encounter for blood transfusion     Gout     HLD (hyperlipidemia)     HTN (hypertension)     Hypothyroid     Myocardial infarction     Restless leg syndrome, controlled     Wears glasses      Kidney stones: No  Cataracts? None    PSHx:  Past Surgical History:   Procedure Laterality Date    CARDIAC SURGERY      CABG X 5  4-2000    CARDIOVERSION      CORONARY ANGIOPLASTY WITH STENT PLACEMENT  11/2014    CORONARY ARTERY BYPASS GRAFT      CYSTOSCOPY N/A 12/27/2018    Procedure: CYSTOSCOPY;  Surgeon: Yris Arreaga MD;  Location: [facility] OR;  Service: Urology;  Laterality: N/A;    CYSTOSCOPY N/A 4/15/2019    Procedure: CYSTOSCOPY;  Surgeon: Yris Arreaga MD;  Location: [facility] OR;  Service: Urology;  Laterality: N/A;    TONSILLECTOMY      TREATMENT OF CARDIAC ARRHYTHMIA N/A 5/26/2020    Procedure: CARDIOVERSION;  Surgeon: Edward Cobb MD;  Location: [facility] CATH/EP LAB;  Service: Cardiology;  Laterality: N/A;     Cardiology: stents x 1, last one placed in 2005, cabg in 2001. afib  on xarelto. PVD and on pletal.  Cardiologist:Dr.George Cobb - sees every 3 months  Nephrologist: karla    Colonoscopy: FOBT negative 2018 never had a colonscopy-aunt  colon cancer    Fam Hx:   malignancies: No. Gyn cancer: mother with ovarian cancer diagnosed at end of life. Mother with colon cancer.  Mother  a 80. Father  a 84  kidney stones: No     Soc Hx:  Quit smoking 2000. 45 pack year hx of smoking  No alcohol  Lives in [place]  , here with wife Poly  2 children  Owns a box company    Allergies:  Patient has no known allergies.    Medications: sildenafil 100mg, flomax 0.4mg nightly   Anticoagulation: Yes - Pletal 100 mg one tablet daily, Xarelto 20 mg one tablet daily for afib    Objective:     There were no vitals filed for this visit.  No vitals or exam beyond what is listed below performed due to audio visit.  Neuro: awake, alert and oriented  Musculoskeletal: normal range of motion          LABS REVIEW:  Recent Labs   Lab 20  1207 10/16/20  1000 20  0805   WBC 5.99 7.07 7.77   Hemoglobin 13.6 L 14.9 16.0   Hematocrit 41.6 46.2 48.4   Platelets 136 L 162 150   ]  Recent Labs   Lab 18  2038 20  1207 20  0820 10/16/20  1000 20  0805   Sodium 139 137 138 144  --    Potassium 4.2 5.5 H 4.9 4.4  --    Chloride 108 105 109 109  --    CO2 22 L 23 24 26  --    BUN 34 H 40 H 31 H 20 14   Creatinine 2.2 H 2.1 H 1.7 H 1.7 H 1.32   Glucose 104 94 107 104  --    Calcium 9.8 9.1 8.9 9.3  --    Alkaline Phosphatase 41 L 54 L  --  508 H  --    Total Protein 7.0 6.4  --  7.2  --    Albumin 4.1 3.9  --  3.8  --    Total Bilirubin 0.5 0.9  --  0.7  --    AST 16 17  --  101 H  --    ALT 11 18  --  193 H  --    ]    Lab Results   Component Value Date    HGBA1C 6.3 (H) 02/15/2015       Pathology: See hpi for recent   Cytology 18 and 18: negative for cancer cells    Radiology See hpi for recent   Rug 18  No extravasation of urine    ctu 18  images reviewed  1. No obstructive uropathy, stones or other acute  abnormality.  2. Left renal simple cyst.  3. Prostatomegaly.  Correlate with PSA.  4. Coronary artery disease.    Assessment:       1. Benign prostatic hyperplasia, unspecified whether lower urinary tract symptoms present    2. Erectile dysfunction, unspecified erectile dysfunction type          Plan:       · psa screening repeat came back lower. Now 3.6. will cont to monitor once yearly.   · Continue flomax 0.4mg nightly, doing well on this. Refilled for a year.   · Briefly discussed options for ED treatment.   · Continue tadalafil 5mg once a day. Good erection but poor duration unless he takes extra. Refilled at [facility]. Use good rx.   · Follow up in 1-2 months in clinic to discuss further ED options (NILTON vs penile injection (on xarelto). After he starts undergoing treatment for lung cancer and ANSELMO and pvr   · Telephone visit: 30 minutes      Yris Arreaga,

## 2020-12-10 NOTE — PATIENT INSTRUCTIONS
· psa screening repeat came back lower. Now 3.6. will cont to monitor once yearly.   · Continue flomax 0.4mg nightly, doing well on this. Refilled for a year.   · Briefly discussed options for ED treatment.   · Continue tadalafil 5mg once a day. Good erection but poor duration unless he takes extra. Refilled at [facility]. Use good rx.   · Follow up in 1-2 months in clinic to discuss further ED options (NILTON vs penile injection (on xarelto). After he starts undergoing treatment for lung cancer.

## 2021-01-01 ENCOUNTER — TELEPHONE (OUTPATIENT)
Dept: HEMATOLOGY/ONCOLOGY | Facility: CLINIC | Age: 66
End: 2021-01-01

## 2021-01-01 ENCOUNTER — OFFICE VISIT (OUTPATIENT)
Dept: HEMATOLOGY/ONCOLOGY | Facility: CLINIC | Age: 66
End: 2021-01-01
Payer: MEDICARE

## 2021-01-01 ENCOUNTER — PATIENT MESSAGE (OUTPATIENT)
Dept: HEMATOLOGY/ONCOLOGY | Facility: CLINIC | Age: 66
End: 2021-01-01

## 2021-01-01 ENCOUNTER — CLINICAL SUPPORT (OUTPATIENT)
Dept: URGENT CARE | Facility: CLINIC | Age: 66
End: 2021-01-01
Payer: MEDICARE

## 2021-01-01 VITALS
TEMPERATURE: 98 F | RESPIRATION RATE: 24 BRPM | SYSTOLIC BLOOD PRESSURE: 133 MMHG | WEIGHT: 239.63 LBS | HEART RATE: 111 BPM | HEART RATE: 108 BPM | BODY MASS INDEX: 32.46 KG/M2 | OXYGEN SATURATION: 89 % | TEMPERATURE: 97 F | DIASTOLIC BLOOD PRESSURE: 74 MMHG | OXYGEN SATURATION: 88 % | HEIGHT: 72 IN

## 2021-01-01 DIAGNOSIS — U07.1 COVID-19 VIRUS DETECTED: ICD-10-CM

## 2021-01-01 DIAGNOSIS — C34.92 PRIMARY LUNG SQUAMOUS CELL CARCINOMA, LEFT: Primary | ICD-10-CM

## 2021-01-01 DIAGNOSIS — R06.02 SHORTNESS OF BREATH: Primary | ICD-10-CM

## 2021-01-01 DIAGNOSIS — J40 BRONCHITIS: ICD-10-CM

## 2021-01-01 LAB
CTP QC/QA: YES
SARS-COV-2 RDRP RESP QL NAA+PROBE: POSITIVE

## 2021-01-01 PROCEDURE — 1159F MED LIST DOCD IN RCRD: CPT | Mod: S$GLB,,, | Performed by: INTERNAL MEDICINE

## 2021-01-01 PROCEDURE — U0002: ICD-10-PCS | Mod: QW,S$GLB,, | Performed by: PHYSICIAN ASSISTANT

## 2021-01-01 PROCEDURE — 3008F PR BODY MASS INDEX (BMI) DOCUMENTED: ICD-10-PCS | Mod: CPTII,S$GLB,, | Performed by: INTERNAL MEDICINE

## 2021-01-01 PROCEDURE — 3078F PR MOST RECENT DIASTOLIC BLOOD PRESSURE < 80 MM HG: ICD-10-PCS | Mod: CPTII,S$GLB,, | Performed by: INTERNAL MEDICINE

## 2021-01-01 PROCEDURE — 3078F DIAST BP <80 MM HG: CPT | Mod: CPTII,S$GLB,, | Performed by: INTERNAL MEDICINE

## 2021-01-01 PROCEDURE — 3288F PR FALLS RISK ASSESSMENT DOCUMENTED: ICD-10-PCS | Mod: CPTII,S$GLB,, | Performed by: INTERNAL MEDICINE

## 2021-01-01 PROCEDURE — 3075F SYST BP GE 130 - 139MM HG: CPT | Mod: CPTII,S$GLB,, | Performed by: INTERNAL MEDICINE

## 2021-01-01 PROCEDURE — 99999 PR PBB SHADOW E&M-EST. PATIENT-LVL III: CPT | Mod: PBBFAC,,, | Performed by: INTERNAL MEDICINE

## 2021-01-01 PROCEDURE — 1126F PR PAIN SEVERITY QUANTIFIED, NO PAIN PRESENT: ICD-10-PCS | Mod: S$GLB,,, | Performed by: INTERNAL MEDICINE

## 2021-01-01 PROCEDURE — 3008F BODY MASS INDEX DOCD: CPT | Mod: CPTII,S$GLB,, | Performed by: INTERNAL MEDICINE

## 2021-01-01 PROCEDURE — 1159F PR MEDICATION LIST DOCUMENTED IN MEDICAL RECORD: ICD-10-PCS | Mod: S$GLB,,, | Performed by: INTERNAL MEDICINE

## 2021-01-01 PROCEDURE — 99999 PR PBB SHADOW E&M-EST. PATIENT-LVL III: ICD-10-PCS | Mod: PBBFAC,,, | Performed by: INTERNAL MEDICINE

## 2021-01-01 PROCEDURE — 99214 OFFICE O/P EST MOD 30 MIN: CPT | Mod: S$GLB,,, | Performed by: INTERNAL MEDICINE

## 2021-01-01 PROCEDURE — 3075F PR MOST RECENT SYSTOLIC BLOOD PRESS GE 130-139MM HG: ICD-10-PCS | Mod: CPTII,S$GLB,, | Performed by: INTERNAL MEDICINE

## 2021-01-01 PROCEDURE — 99214 PR OFFICE/OUTPT VISIT, EST, LEVL IV, 30-39 MIN: ICD-10-PCS | Mod: S$GLB,,, | Performed by: INTERNAL MEDICINE

## 2021-01-01 PROCEDURE — 1126F AMNT PAIN NOTED NONE PRSNT: CPT | Mod: S$GLB,,, | Performed by: INTERNAL MEDICINE

## 2021-01-01 PROCEDURE — 3288F FALL RISK ASSESSMENT DOCD: CPT | Mod: CPTII,S$GLB,, | Performed by: INTERNAL MEDICINE

## 2021-01-01 PROCEDURE — U0002 COVID-19 LAB TEST NON-CDC: HCPCS | Mod: QW,S$GLB,, | Performed by: PHYSICIAN ASSISTANT

## 2021-01-01 PROCEDURE — 1101F PR PT FALLS ASSESS DOC 0-1 FALLS W/OUT INJ PAST YR: ICD-10-PCS | Mod: CPTII,S$GLB,, | Performed by: INTERNAL MEDICINE

## 2021-01-01 PROCEDURE — 1101F PT FALLS ASSESS-DOCD LE1/YR: CPT | Mod: CPTII,S$GLB,, | Performed by: INTERNAL MEDICINE

## 2021-01-01 RX ORDER — LEVOFLOXACIN 750 MG/1
750 TABLET ORAL DAILY
Qty: 7 TABLET | Refills: 0 | Status: SHIPPED | OUTPATIENT
Start: 2021-01-01 | End: 2021-01-01

## 2021-01-13 PROBLEM — J12.82 PNEUMONIA DUE TO COVID-19 VIRUS: Status: ACTIVE | Noted: 2021-01-01

## 2021-01-13 PROBLEM — U07.1 COVID-19: Status: ACTIVE | Noted: 2021-01-01

## 2021-01-13 PROBLEM — Z71.89 ACP (ADVANCE CARE PLANNING): Status: ACTIVE | Noted: 2021-01-01

## 2021-01-20 PROBLEM — R94.31 QT PROLONGATION: Status: ACTIVE | Noted: 2021-01-01

## 2021-01-20 PROBLEM — R79.89 ABNORMAL TSH: Status: ACTIVE | Noted: 2021-01-01

## 2021-01-21 PROBLEM — J80 ACUTE RESPIRATORY DISTRESS SYNDROME (ARDS) DUE TO COVID-19 VIRUS: Status: ACTIVE | Noted: 2021-01-01

## 2021-01-21 PROBLEM — R79.89 ELEVATED SERUM FREE T4 LEVEL: Status: ACTIVE | Noted: 2021-01-01

## (undated) DEVICE — SET CYSTO IRRIGATION UNIV SPIK

## (undated) DEVICE — JELLY LUBRICANT STERILE 4 OZ

## (undated) DEVICE — SOLN BETADINE SWAB AIDS

## (undated) DEVICE — GLOVE PROTEXIS PI CLASSIC 6.0

## (undated) DEVICE — WATER STERILE INJ 500ML BAG

## (undated) DEVICE — UNDERGLOVES BIOGEL PI SZ 6 LF

## (undated) DEVICE — SEE MEDLINE ITEM 152651

## (undated) DEVICE — CONTAINER SPECIMEN STRL 4OZ

## (undated) DEVICE — SHEET DRAPE MEDIUM

## (undated) DEVICE — URINAL MALE WITH LID DISP

## (undated) DEVICE — SEE ITEM #152308

## (undated) DEVICE — APRON DISPOSP PLASTIC 24INX42